# Patient Record
Sex: FEMALE | Race: WHITE | Employment: PART TIME | URBAN - METROPOLITAN AREA
[De-identification: names, ages, dates, MRNs, and addresses within clinical notes are randomized per-mention and may not be internally consistent; named-entity substitution may affect disease eponyms.]

---

## 2020-11-04 LAB
CHLAMYDIA, EXTERNAL: NEGATIVE
HBSAG, EXTERNAL: NEGATIVE
HIV, EXTERNAL: NORMAL
RUBELLA, EXTERNAL: NORMAL
T. PALLIDUM, EXTERNAL: NORMAL
TYPE, ABO & RH, EXTERNAL: NORMAL

## 2021-04-15 ENCOUNTER — HOSPITAL ENCOUNTER (INPATIENT)
Age: 31
End: 2021-04-15
Attending: OBSTETRICS & GYNECOLOGY | Admitting: OBSTETRICS & GYNECOLOGY

## 2021-04-16 ENCOUNTER — HOSPITAL ENCOUNTER (OUTPATIENT)
Dept: PREADMISSION TESTING | Age: 31
Discharge: HOME | End: 2021-04-16
Payer: COMMERCIAL

## 2021-04-16 ENCOUNTER — TRANSCRIBE ORDER (OUTPATIENT)
Dept: REGISTRATION | Age: 31
End: 2021-04-16

## 2021-04-16 DIAGNOSIS — Z01.812 PRE-PROCEDURE LAB EXAM: Primary | ICD-10-CM

## 2021-04-16 DIAGNOSIS — Z01.812 PRE-PROCEDURE LAB EXAM: ICD-10-CM

## 2021-04-16 PROCEDURE — U0003 INFECTIOUS AGENT DETECTION BY NUCLEIC ACID (DNA OR RNA); SEVERE ACUTE RESPIRATORY SYNDROME CORONAVIRUS 2 (SARS-COV-2) (CORONAVIRUS DISEASE [COVID-19]), AMPLIFIED PROBE TECHNIQUE, MAKING USE OF HIGH THROUGHPUT TECHNOLOGIES AS DESCRIBED BY CMS-2020-01-R: HCPCS

## 2021-04-17 LAB — SARS-COV-2, COV2NT: NOT DETECTED

## 2021-04-17 NOTE — H&P
Obstetrics Admission History & Physical    Name: Myrna Adams MRN: [id number]  SSN: [id number]    YOB: 1990  Age: 27 y.o. Sex: female      Subjective:     Reason for Admission:  Pregnancy and vasa previa    History of Present Illness: Myrna Adams is a 27 y.o.  female with planned admission on 21 at which time she will be 32w2d for inpatient management of vasa previa. Pregnancy has been complicated by:  1. velamentous cord insertion with marginal previa which is being managed as a vasa previa, with C/S already scheduled for 21 at nearly 35wk. She has had no bleeding and CL remains long. Last growth U/S 3/23/21 with EFW 3#1 c/w 73%ile. 2. h/o 2nd trimester PPROM of twins f/b C/S delivery and demise of one twin, she has been maintained on daily vaginal progesterone. 3. h/o hypothyroidism but has had normal TFTs this pregnancy without medication. 4. Abnormal 1hr GTT but normal 3hr GTT  5. H/o C/S x 1 as above f/b successful  at term  6. Rh neg s/p rhogam        OB History    No obstetric history on file. No past medical history on file. Past Surgical History:   Procedure Laterality Date    HX GYN       Social History     Socioeconomic History    Marital status:      Spouse name: Not on file    Number of children: Not on file    Years of education: Not on file    Highest education level: Not on file   Occupational History    Not on file   Social Needs    Financial resource strain: Not on file    Food insecurity     Worry: Not on file     Inability: Not on file    Transportation needs     Medical: Not on file     Non-medical: Not on file   Tobacco Use    Smoking status: Never Smoker   Substance and Sexual Activity    Alcohol use:  Yes    Drug use: Not on file    Sexual activity: Not on file   Lifestyle    Physical activity     Days per week: Not on file     Minutes per session: Not on file    Stress: Not on file   Relationships    Social connections     Talks on phone: Not on file     Gets together: Not on file     Attends Buddhism service: Not on file     Active member of club or organization: Not on file     Attends meetings of clubs or organizations: Not on file     Relationship status: Not on file    Intimate partner violence     Fear of current or ex partner: Not on file     Emotionally abused: Not on file     Physically abused: Not on file     Forced sexual activity: Not on file   Other Topics Concern    Not on file   Social History Narrative    Not on file     Family History   Problem Relation Age of Onset    Hypertension Mother     Hypertension Father      No Known Allergies  Prior to Admission medications    Medication Sig Start Date End Date Taking? Authorizing Provider   azithromycin (ZITHROMAX Z-JOSEPH) 250 mg tablet Take 500 mg by mouth on day 1, then take 250 mg by mouth every 24 hours x 4 days 12/22/15   Kai Dandy, NP        Review of Systems:  A comprehensive review of systems was negative except for that written in the History of Present Illness. Objective:     Vitals: There were no vitals taken for this visit. No data recorded. No data recorded     Physical Exam:  Patient without distress. Heart: Regular rate and rhythm  Lung: normal respiratory effort  Abdomen: soft, nontender, gravid  Lower Extremities:  - Edema No       Membranes:  Intact       Labs: No results found for this or any previous visit (from the past 24 hour(s)). Assessment and Plan:     [id number] at [place] [address] being admitted for inaptient management of velamentous cord insertion coupled with marginal previa being managed as vasa previa.   Prior C/S x1.    - admit to [facility]  - CBC, T&S  - monitor for bleeding/PTL  - daily NST  - weekly surveillance with MFM for BPP  - cont vaginal progesterone  - scheduled for C/S delivery 5/7/21    Signed By:  Gilles Adams MD     April 17, 2021

## 2021-04-19 ENCOUNTER — HOSPITAL ENCOUNTER (INPATIENT)
Age: 31
LOS: 15 days | Discharge: HOME | End: 2021-05-07
Attending: OBSTETRICS & GYNECOLOGY | Admitting: OBSTETRICS & GYNECOLOGY
Payer: COMMERCIAL

## 2021-04-19 VITALS
HEART RATE: 91 BPM | TEMPERATURE: 97.4 F | RESPIRATION RATE: 16 BRPM | DIASTOLIC BLOOD PRESSURE: 72 MMHG | SYSTOLIC BLOOD PRESSURE: 109 MMHG | OXYGEN SATURATION: 98 %

## 2021-04-19 DIAGNOSIS — O43.103 PLACENTAL ABNORMALITY IN THIRD TRIMESTER: Primary | ICD-10-CM

## 2021-04-19 LAB
ERYTHROCYTE [DISTWIDTH] IN BLOOD BY AUTOMATED COUNT: 13.8 % (ref 11.5–14.5)
HCT VFR BLD AUTO: 39.7 % (ref 35–47)
HGB BLD-MCNC: 13.2 G/DL (ref 11.5–16)
MCH RBC QN AUTO: 28.8 PG (ref 26–34)
MCHC RBC AUTO-ENTMCNC: 33.2 G/DL (ref 30–36.5)
MCV RBC AUTO: 86.7 FL (ref 80–99)
NRBC # BLD: 0 K/UL (ref 0–0.01)
NRBC BLD-RTO: 0 PER 100 WBC
PLATELET # BLD AUTO: 193 K/UL (ref 150–400)
PMV BLD AUTO: 11.3 FL (ref 8.9–12.9)
RBC # BLD AUTO: 4.58 M/UL (ref 3.8–5.2)
WBC # BLD AUTO: 10 K/UL (ref 3.6–11)

## 2021-04-19 PROCEDURE — 36415 COLL VENOUS BLD VENIPUNCTURE: CPT

## 2021-04-19 PROCEDURE — 65410000002 HC RM PRIVATE OB

## 2021-04-19 PROCEDURE — 86901 BLOOD TYPING SEROLOGIC RH(D): CPT

## 2021-04-19 PROCEDURE — 85027 COMPLETE CBC AUTOMATED: CPT

## 2021-04-19 PROCEDURE — 86922 COMPATIBILITY TEST ANTIGLOB: CPT

## 2021-04-19 PROCEDURE — 74011250636 HC RX REV CODE- 250/636: Performed by: OBSTETRICS & GYNECOLOGY

## 2021-04-19 PROCEDURE — 86921 COMPATIBILITY TEST INCUBATE: CPT

## 2021-04-19 PROCEDURE — 86644 CMV ANTIBODY: CPT

## 2021-04-19 PROCEDURE — 86870 RBC ANTIBODY IDENTIFICATION: CPT

## 2021-04-19 PROCEDURE — 86920 COMPATIBILITY TEST SPIN: CPT

## 2021-04-19 PROCEDURE — 74011250637 HC RX REV CODE- 250/637: Performed by: OBSTETRICS & GYNECOLOGY

## 2021-04-19 RX ORDER — SODIUM CHLORIDE 0.9 % (FLUSH) 0.9 %
5-40 SYRINGE (ML) INJECTION AS NEEDED
Status: DISCONTINUED | OUTPATIENT
Start: 2021-04-19 | End: 2021-04-28

## 2021-04-19 RX ORDER — ONDANSETRON 4 MG/1
4 TABLET, ORALLY DISINTEGRATING ORAL
Status: DISCONTINUED | OUTPATIENT
Start: 2021-04-19 | End: 2021-04-28

## 2021-04-19 RX ORDER — ACETAMINOPHEN 325 MG/1
650 TABLET ORAL
Status: DISCONTINUED | OUTPATIENT
Start: 2021-04-19 | End: 2021-04-22

## 2021-04-19 RX ORDER — SODIUM CHLORIDE 0.9 % (FLUSH) 0.9 %
5-40 SYRINGE (ML) INJECTION EVERY 8 HOURS
Status: DISCONTINUED | OUTPATIENT
Start: 2021-04-19 | End: 2021-04-28

## 2021-04-19 RX ORDER — SWAB
1 SWAB, NON-MEDICATED MISCELLANEOUS DAILY
Status: DISCONTINUED | OUTPATIENT
Start: 2021-04-20 | End: 2021-04-22

## 2021-04-19 RX ORDER — DIPHENHYDRAMINE HCL 25 MG
25 CAPSULE ORAL
Status: DISCONTINUED | OUTPATIENT
Start: 2021-04-19 | End: 2021-04-28

## 2021-04-19 RX ORDER — PROGESTERONE 100 MG/1
200 CAPSULE ORAL
Status: DISCONTINUED | OUTPATIENT
Start: 2021-04-19 | End: 2021-04-22

## 2021-04-19 RX ORDER — BETAMETHASONE SODIUM PHOSPHATE AND BETAMETHASONE ACETATE 3; 3 MG/ML; MG/ML
12 INJECTION, SUSPENSION INTRA-ARTICULAR; INTRALESIONAL; INTRAMUSCULAR; SOFT TISSUE EVERY 24 HOURS
Status: COMPLETED | OUTPATIENT
Start: 2021-04-19 | End: 2021-04-20

## 2021-04-19 RX ADMIN — BETAMETHASONE SODIUM PHOSPHATE AND BETAMETHASONE ACETATE 12 MG: 3; 3 INJECTION, SUSPENSION INTRA-ARTICULAR; INTRALESIONAL; INTRAMUSCULAR at 20:00

## 2021-04-19 RX ADMIN — PROGESTERONE 200 MG: 100 CAPSULE, LIQUID FILLED ORAL at 22:00

## 2021-04-19 NOTE — PROGRESS NOTES
Bedside and Verbal shift change report given to BHUMI Gage (oncoming nurse) by Nadine Lopez RN (offgoing nurse). Report included the following information SBAR, Kardex, Intake/Output, MAR, Accordion and Recent Results.

## 2021-04-20 PROCEDURE — 76819 FETAL BIOPHYS PROFIL W/O NST: CPT | Performed by: OBSTETRICS & GYNECOLOGY

## 2021-04-20 PROCEDURE — 74011250636 HC RX REV CODE- 250/636: Performed by: OBSTETRICS & GYNECOLOGY

## 2021-04-20 PROCEDURE — 74011250637 HC RX REV CODE- 250/637: Performed by: OBSTETRICS & GYNECOLOGY

## 2021-04-20 PROCEDURE — 76816 OB US FOLLOW-UP PER FETUS: CPT | Performed by: OBSTETRICS & GYNECOLOGY

## 2021-04-20 PROCEDURE — 59025 FETAL NON-STRESS TEST: CPT

## 2021-04-20 RX ADMIN — Medication 1 TABLET: at 22:04

## 2021-04-20 RX ADMIN — PROGESTERONE 200 MG: 100 CAPSULE, LIQUID FILLED ORAL at 21:09

## 2021-04-20 RX ADMIN — BETAMETHASONE SODIUM PHOSPHATE AND BETAMETHASONE ACETATE 12 MG: 3; 3 INJECTION, SUSPENSION INTRA-ARTICULAR; INTRALESIONAL; INTRAMUSCULAR at 21:09

## 2021-04-20 NOTE — PROGRESS NOTES
Care Management Interventions  PCP Verified by CM: Danna Gill  Last Visit to PCP: 21  Palliative Care Criteria Met (RRAT>21 & CHF Dx)?: No  Mode of Transport at Discharge: (Charmayne Carrie )  Transition of Care Consult (CM Consult): Other( ON 2021)  MyChart Signup: No  Discharge Durable Medical Equipment: No  Physical Therapy Consult: No  Occupational Therapy Consult: No  Speech Therapy Consult: No  Current Support Network: Lives with Spouse(JAVID )  Confirm Follow Up Transport: Family(SPOUSE)  Freedom of Choice List was Provided with Basic Dialogue that Supports the Patient's Individualized Plan of Care/Goals, Treatment Preferences and Shares the Quality Data Associated with the Providers?: No  Discharge Location  Discharge Placement: Home   Patient admitted on 2021 from MD office to L and D and then transferred to [place] [address] [address]  She will stay on bedrest and will have a scheduled  on 2021. Patient has one child at home and her  has another child. Patient did experience a fetal demise and is now carrying one child. She uses the [facility] near her home and states that there are some close friends and family who are able to help her family. She does not have a PCP and does not feel she needs to one at this time. Patient does not have an ACP and declined additional information. Care management will follow for any needs and transtions of care.

## 2021-04-20 NOTE — PROGRESS NOTES
Bedside and Verbal shift change report given to [address] Ng (oncoming nurse) by Leticia Best (offgoing nurse). Report included the following information SBAR, Kardex, Intake/Output, MAR, Recent Results and Quality Measures.

## 2021-04-20 NOTE — PROGRESS NOTES
Ante Partum Progress Note    Dwight Beyer  Unknown    Assessment: 28 yo [id number] at 32.3 admitted with velamentous cord insertion coupled with marginal previa being managed as vasa previa. - No bleeding, Reactive NST     Hx of C/S: scheduled for repeat 21     Hx of Hypothyroidism: TFT WNL without medication      Hx of  2nd trimester PPROM of twins f/b C/S delivery and demise of one twin   - Cont Vaginal Progesterone     Rh neg- s/p Rhogam       Plan:   Daily NST   Type and screen done    S/p BMZ #1   weekly surveillance with MFM for BPP- consult ordered    cont vaginal progesterone   scheduled for C/S delivery 21      Orders/Charges: Medium, NST X1     Patient states she has no new complaints. She denies pain or contractions. No bleeding. Active fetal movement. Vitals:  Visit Vitals  /64 (BP 1 Location: Right upper arm, BP Patient Position: At rest)   Pulse 74   Temp 97.6 °F (36.4 °C)   Resp 16   Ht 5' 5\" (1.651 m)   Wt 70.8 kg (156 lb)   SpO2 96%   BMI 25.96 kg/m²     Temp (24hrs), Av.7 °F (36.5 °C), Min:97.4 °F (36.3 °C), Max:98.2 °F (36.8 °C)      Last 24hr Input/Output:    Intake/Output Summary (Last 24 hours) at 2021 1154  Last data filed at 2021 1051  Gross per 24 hour   Intake 900 ml   Output    Net 900 ml        Non stress test:  Reactive    Patient Vitals for the past 4 hrs: Mode Fetal Heart Rate RN Reviewed Strip?   21 0922 External 125 Yes      Patient Vitals for the past 4 hrs: Mode Fetal Heart Rate RN Reviewed Strip?   21 0922 External 125 Yes        NST: 120's/mod/ + accel/ no decels     Uterine Activity: None     Exam:  Patient without distress.      Abdomen, fundus soft non-tender     Extremities, no redness or tenderness               Additional Exam: Deferred    Labs:     Lab Results   Component Value Date/Time    WBC 10.0 2021 07:08 PM    HGB 13.2 2021 07:08 PM    HCT 39.7 2021 07:08 PM    PLATELET 947  07:08 PM Recent Results (from the past 24 hour(s))   CBC W/O DIFF    Collection Time: 04/19/21  7:08 PM   Result Value Ref Range    WBC 10.0 3.6 - 11.0 K/uL    RBC 4.58 3.80 - 5.20 M/uL    HGB 13.2 11.5 - 16.0 g/dL    HCT 39.7 35.0 - 47.0 %    MCV 86.7 80.0 - 99.0 FL    MCH 28.8 26.0 - 34.0 PG    MCHC 33.2 30.0 - 36.5 g/dL    RDW 13.8 11.5 - 14.5 %    PLATELET 744 636 - 646 K/uL    MPV 11.3 8.9 - 12.9 FL    NRBC 0.0 0  WBC    ABSOLUTE NRBC 0.00 0.00 - 0.01 K/uL   TYPE & SCREEN    Collection Time: 04/19/21  7:08 PM   Result Value Ref Range    Crossmatch Expiration 04/22/2021,2359     ABO/Rh(D) Judy Batch NEGATIVE     Antibody screen POS     Antibody ID Anti-D passively acquired     Unit number [id number]     Blood component type [facility]     Unit division 00     Status of unit ALLOCATED     Crossmatch result Compatible     Unit number [id number]     Blood component type [facility]     Unit division 00     Status of unit ALLOCATED     Crossmatch result Compatible

## 2021-04-21 PROBLEM — O43.109 PLACENTAL ABNORMALITY: Status: ACTIVE | Noted: 2021-04-21

## 2021-04-21 PROCEDURE — 99218 HC RM OBSERVATION: CPT

## 2021-04-21 PROCEDURE — 74011250637 HC RX REV CODE- 250/637: Performed by: OBSTETRICS & GYNECOLOGY

## 2021-04-21 PROCEDURE — 59025 FETAL NON-STRESS TEST: CPT

## 2021-04-21 RX ORDER — MAG HYDROX/ALUMINUM HYD/SIMETH 200-200-20
30 SUSPENSION, ORAL (FINAL DOSE FORM) ORAL
Status: DISCONTINUED | OUTPATIENT
Start: 2021-04-21 | End: 2021-05-05

## 2021-04-21 RX ADMIN — Medication 1 TABLET: at 14:22

## 2021-04-21 RX ADMIN — PROGESTERONE 200 MG: 100 CAPSULE, LIQUID FILLED ORAL at 21:30

## 2021-04-21 NOTE — PROGRESS NOTES
Reviewed prolonged monitoring with MFM Dr. Germain Orr. Baseline 140, +accels, occasional brief variable decels with vannessa to 80-90bpm.     MFM not concerned, considered normal fetal strip, can stop monitoring today.       Juan Higuera MD

## 2021-04-21 NOTE — PROGRESS NOTES
NST from am with 1 variable decel in 20 minutes. Dwaine to 85bpm, returned to baseline within 30seconds. Placed on NST for another 30 minutes with small intermittent variables    Discussed Placental pathology and findings today with MFM Dr. Christian Goodman who recommended further monitoring x 2-4 hours, if no repetitive variables can go back to daily NSTs.        Beni Melchor MD

## 2021-04-21 NOTE — PROGRESS NOTES
Bedside and Verbal shift change report given to SARAH Persaud RN (oncoming nurse) by Corinne Brunt RN (offgoing nurse). Report included the following information SBAR and Kardex.

## 2021-04-21 NOTE — PROGRESS NOTES
Ante Partum Progress Note    Jeana Caller  Unknown    Assessment: 26 yo [id number] at 32.3 admitted with velamentous cord insertion coupled with marginal previa being managed as vasa previa.    - No bleeding, Reactive NST   - s/p BMZ  - BPP , growth 59%ile AFV 17.2    Hx of C/S: scheduled for repeat 21      Hx of Hypothyroidism: TFT WNL without medication       Hx of  2nd trimester PPROM of twins f/b C/S delivery and demise of one twin   - Cont Vaginal Progesterone      Rh neg- s/p Rhogam     Plan:    Daily NST   Type and screen done    weekly surveillance with MFM for BPP (last )   cont vaginal progesterone   scheduled for C/S delivery 21    Orders/Charges: High and Non Stress Test    Patient states she does not have headache , abdominal pain  , contractions, right upper quadrant pain  , vaginal bleeding , swelling and vaginal leaking of fluid     Vitals:  Visit Vitals  /83 (BP 1 Location: Left arm, BP Patient Position: At rest)   Pulse 72   Temp 98.2 °F (36.8 °C)   Resp 18   Ht 5' 5\" (1.651 m)   Wt 70.8 kg (156 lb)   SpO2 99%   BMI 25.96 kg/m²     Temp (24hrs), Av.1 °F (36.7 °C), Min:97.7 °F (36.5 °C), Max:98.4 °F (36.9 °C)      Last 24hr Input/Output:  No intake or output data in the 24 hours ending 21 1404     Non stress test:  Reactive    Patient Vitals for the past 4 hrs: Mode Fetal Heart Rate Variability Decelerations Accelerations RN Reviewed Strip? Non Stress Test   21 1301 External 140 6-25 BPM Variable Yes Yes Reactive   21 1018 External 125 6-25 BPM Variable Yes Yes Reactive      Patient Vitals for the past 4 hrs: Mode Fetal Heart Rate Variability Decelerations Accelerations RN Reviewed Strip? Non Stress Test   21 1301 External 140 6-25 BPM Variable Yes Yes Reactive   21 1018 External 125 6-25 BPM Variable Yes Yes Reactive        Uterine Activity: None     Exam:  Patient without distress.      Abdomen, fundus soft non-tender Extremities, no redness or tenderness               Additional Exam: Deferred    Labs:     Lab Results   Component Value Date/Time    WBC 10.0 04/19/2021 07:08 PM    HGB 13.2 04/19/2021 07:08 PM    HCT 39.7 04/19/2021 07:08 PM    PLATELET 849 01/18/2023 07:08 PM       No results found for this or any previous visit (from the past 24 hour(s)).

## 2021-04-21 NOTE — PROGRESS NOTES
Bedside shift change report given to Lambert Cali RN (oncoming nurse) by Ethel Woods RN (offgoing nurse). Report included the following information SBAR, Kardex, Procedure Summary, Intake/Output and MAR.

## 2021-04-22 PROCEDURE — 74011250637 HC RX REV CODE- 250/637: Performed by: OBSTETRICS & GYNECOLOGY

## 2021-04-22 PROCEDURE — 99218 HC RM OBSERVATION: CPT

## 2021-04-22 PROCEDURE — 65410000002 HC RM PRIVATE OB

## 2021-04-22 PROCEDURE — 87081 CULTURE SCREEN ONLY: CPT

## 2021-04-22 PROCEDURE — 59025 FETAL NON-STRESS TEST: CPT

## 2021-04-22 RX ORDER — ACETAMINOPHEN 325 MG/1
650 TABLET ORAL
Status: DISCONTINUED | OUTPATIENT
Start: 2021-04-22 | End: 2021-05-05

## 2021-04-22 RX ORDER — PROGESTERONE 100 MG/1
200 CAPSULE ORAL
Status: DISCONTINUED | OUTPATIENT
Start: 2021-04-22 | End: 2021-05-05

## 2021-04-22 RX ORDER — SWAB
1 SWAB, NON-MEDICATED MISCELLANEOUS DAILY
Status: DISCONTINUED | OUTPATIENT
Start: 2021-04-22 | End: 2021-05-05

## 2021-04-22 RX ADMIN — PROGESTERONE 200 MG: 100 CAPSULE, LIQUID FILLED ORAL at 21:30

## 2021-04-22 RX ADMIN — Medication 1 TABLET: at 21:30

## 2021-04-22 NOTE — PROGRESS NOTES
8299 Bedside and Verbal shift change report given to REAL Corona (oncoming nurse) by Rick Davenport (offgoing nurse). Report included the following information SBAR, Kardex, MAR, Accordion and Recent Results.

## 2021-04-22 NOTE — H&P
There has been no interval change from H&P dated 4/17/21. Patient is admitted for inpatient management of vasa previa until planned delivery.

## 2021-04-22 NOTE — PROGRESS NOTES
Ante Partum Progress Note    Tiffany Hylton  32w5d    Assessment: 28 yo  at 32.3 admitted with velamentous cord insertion coupled with marginal previa being managed as vasa previa.      1. Vasa previa  - s/p BMZ  - BPP , growth 4#12 c/w 59%ile, AFV 17.2  - daily NST and weekly BPP  - C/S delivery [id number]  - per [facility]: If mild, intermittent variables are noted in the setting of otherwise reassuring testing, expectant management is reasonable prior to 34 weeks. However, if the variable decelerations are persistent, the NST is nonreassuring, or vaginal bleeding or contractions develop, then  delivery may be indicated.     2. Hx of C/S: scheduled for repeat 21     3. Hx of Hypothyroidism: TFTs WNL without medication      4. Hx of  2nd trimester PPROM of twins f/b C/S delivery and demise of one twin d/t NEC   - cont prometrium     5. Rh neg - s/p rhogam at 28wk    6. GBS pending    Orders/Charges: Medium and Non Stress Test    Patient states she has no new complaints. She has no bleeding, contractions or leaking. Baby is active. She is sad to be here away from her kids, but is trying to cope and her kids are visiting daily. Vitals:  Visit Vitals  /62 (BP 1 Location: Left arm, BP Patient Position: At rest)   Pulse 60   Temp 98.7 °F (37.1 °C)   Resp 16   Ht 5' 5\" (1.651 m)   Wt 70.8 kg (156 lb)   SpO2 95%   BMI 25.96 kg/m²     Temp (24hrs), Av.3 °F (36.8 °C), Min:97.8 °F (36.6 °C), Max:98.7 °F (37.1 °C)      Last 24hr Input/Output:    Intake/Output Summary (Last 24 hours) at 2021 1041  Last data filed at 2021 1657  Gross per 24 hour   Intake 240 ml   Output    Net 240 ml        Non stress test:  Reactive    An NST was performed and was reactive. The baseline FHR was 140. Moderate baseline  variability was noted. Accelerations of sufficient amplitude and duration were noted. There were no decelerations noted. Patient Vitals for the past 4 hrs:    Mode Fetal Heart Rate Variability Decelerations Accelerations RN Reviewed Strip? Non Stress Test   04/22/21 1010 External 140 6-25 BPM Variable Yes Yes Reactive      Patient Vitals for the past 4 hrs: Mode Fetal Heart Rate Variability Decelerations Accelerations RN Reviewed Strip? Non Stress Test   04/22/21 1010 External 140 6-25 BPM Variable Yes Yes Reactive        Uterine Activity: None     Exam:  Patient without distress. Abdomen, fundus soft non-tender     Extremities, no redness or tenderness               Additional Exam: Deferred    Labs:     Lab Results   Component Value Date/Time    WBC 10.0 04/19/2021 07:08 PM    HGB 13.2 04/19/2021 07:08 PM    HCT 39.7 04/19/2021 07:08 PM    PLATELET 917 78/06/5228 07:08 PM       No results found for this or any previous visit (from the past 24 hour(s)).

## 2021-04-22 NOTE — PROGRESS NOTES
Bedside and Verbal shift change report given to SARAH Fleming RN (oncoming nurse) by ARIADNA Salamanca RN (offgoing nurse). Report included the following information SBAR and Kardex.

## 2021-04-23 LAB
ABO + RH BLD: NORMAL
BLD PROD TYP BPU: NORMAL
BLD PROD TYP BPU: NORMAL
BLOOD GROUP ANTIBODIES SERPL: NORMAL
BLOOD GROUP ANTIBODIES SERPL: NORMAL
BPU ID: NORMAL
BPU ID: NORMAL
CROSSMATCH RESULT,%XM: NORMAL
CROSSMATCH RESULT,%XM: NORMAL
SPECIMEN EXP DATE BLD: NORMAL
STATUS OF UNIT,%ST: NORMAL
STATUS OF UNIT,%ST: NORMAL
UNIT DIVISION, %UDIV: 0
UNIT DIVISION, %UDIV: 0

## 2021-04-23 PROCEDURE — 86870 RBC ANTIBODY IDENTIFICATION: CPT

## 2021-04-23 PROCEDURE — 86644 CMV ANTIBODY: CPT

## 2021-04-23 PROCEDURE — 86922 COMPATIBILITY TEST ANTIGLOB: CPT

## 2021-04-23 PROCEDURE — 36415 COLL VENOUS BLD VENIPUNCTURE: CPT

## 2021-04-23 PROCEDURE — 86920 COMPATIBILITY TEST SPIN: CPT

## 2021-04-23 PROCEDURE — 65410000002 HC RM PRIVATE OB

## 2021-04-23 PROCEDURE — 59025 FETAL NON-STRESS TEST: CPT

## 2021-04-23 PROCEDURE — 74011250637 HC RX REV CODE- 250/637: Performed by: OBSTETRICS & GYNECOLOGY

## 2021-04-23 PROCEDURE — 86921 COMPATIBILITY TEST INCUBATE: CPT

## 2021-04-23 PROCEDURE — 86901 BLOOD TYPING SEROLOGIC RH(D): CPT

## 2021-04-23 RX ADMIN — ALUMINUM HYDROXIDE, MAGNESIUM HYDROXIDE, AND SIMETHICONE 30 ML: 200; 200; 20 SUSPENSION ORAL at 21:23

## 2021-04-23 RX ADMIN — Medication 1 TABLET: at 21:23

## 2021-04-23 NOTE — PROGRESS NOTES
Ante Partum Progress Note    Francesco Brenner  Unknown    Assessment & Plan: 28 yo [id number] at 35 weeks admitted with velamentous cord insertion coupled with marginal previa being managed as vasa previa.       1. Vasa previa  - s/p BMZ  - BPP , growth 4#12 c/w 59%ile, AFV 17.2  - daily NST and weekly BPP  - C/S delivery [id number]  - per [facility]: If mild, intermittent variables are noted in the setting of otherwise reassuring testing, expectant management is reasonable prior to 34 weeks. However, if the variable decelerations are persistent, the NST is nonreassuring, or vaginal bleeding or contractions develop, then  delivery may be indicated.     2. Hx of C/S: scheduled for repeat 21     3. Hx of Hypothyroidism: labs normal, no meds      4. Hx of  2nd trimester PPROM of twins f/b C/S delivery and demise of one twin d/t NEC   - cont prometrium     5. Rh neg - s/p rhogam at 28wk     6. GBS negative so far (), final read pending    Orders/Charges: Medium and Non Stress Test    Subjective:  Patient states she has no new complaints    Vitals:  Visit Vitals  /72   Pulse 91   Temp 97.4 °F (36.3 °C)   Resp 16   SpO2 98%     Temp (24hrs), Av.5 °F (36.9 °C), Min:98.2 °F (36.8 °C), Max:99 °F (37.2 °C)      Last 24hr Input/Output:  No intake or output data in the 24 hours ending 21 1551     Non stress test:  Reactive  130, moderate variability, positive accelerations, no decelerations  Tocometry: no contractions    Exam:  Patient without distress.     Abdomen, fundus soft non-tender    Extremities, no redness or tenderness               Additional Exam: Deferred    Labs:     Lab Results   Component Value Date/Time    WBC 10.0 2021 07:08 PM    HGB 13.2 2021 07:08 PM    HCT 39.7 2021 07:08 PM    PLATELET 823  07:08 PM       Recent Results (from the past 24 hour(s))   CULTURE, GENITAL GROUP B STREP    Collection Time: 21  5:50 PM    Specimen: VAGINAL/RECTAL Result Value Ref Range    Special Requests: NO SPECIAL REQUESTS      Culture result: NO GROUP B BETA STREPTOCOCCUS ISOLATED , SO FAR     TYPE & SCREEN    Collection Time: 04/23/21  8:40 AM   Result Value Ref Range    Crossmatch Expiration 04/26/2021,2356     ABO/Rh(D) O NEGATIVE     Antibody screen POS     Antibody ID NO ADDITIONAL ANTIBODIES DETECTED     Unit number [id number]     Blood component type [facility] LR     Unit division 00     Status of unit ALLOCATED     Crossmatch result Compatible

## 2021-04-23 NOTE — PROGRESS NOTES
Bedside and Verbal shift change report given to SARAH Brito RN (oncoming nurse) by REAL Gonzáles RN (offgoing nurse). Report included the following information SBAR and Kardex.

## 2021-04-23 NOTE — PROGRESS NOTES
1105 - Transition of Care  (MISHA) Plan:        RUR:  5 %           Disposition: TBD/subject to change pending recommendations; pending medical progression    -- Chart indicates greater than 48 hours - 26 yo  at 32.3 admitted with velamentous cord insertion coupled with marginal previa being managed as vasa previa.      -- Hospital stay until  on 2021    -- OBGYN following    PCP followup: None    Transport: TBD     CM will continue to follow and assist with MISHA needs as they arise. Available on [email or web address]  MSN, [address] [facility], RN, [facility] - [phone].

## 2021-04-23 NOTE — PROGRESS NOTES
Bedside and Verbal shift change report given to BHUMI Roper (oncoming nurse) by Afshin Ndiaye RN (offgoing nurse). Report included the following information SBAR, Kardex, Intake/Output, MAR, Accordion and Recent Results.

## 2021-04-24 PROCEDURE — 65410000002 HC RM PRIVATE OB

## 2021-04-24 PROCEDURE — 74011250637 HC RX REV CODE- 250/637: Performed by: OBSTETRICS & GYNECOLOGY

## 2021-04-24 RX ADMIN — PROGESTERONE 200 MG: 100 CAPSULE, LIQUID FILLED ORAL at 21:31

## 2021-04-24 RX ADMIN — ALUMINUM HYDROXIDE, MAGNESIUM HYDROXIDE, AND SIMETHICONE 30 ML: 200; 200; 20 SUSPENSION ORAL at 21:31

## 2021-04-24 RX ADMIN — Medication 1 TABLET: at 21:31

## 2021-04-24 NOTE — PROGRESS NOTES
0820 Bedside shift change report given to Nusrat Paul RN (oncoming nurse) by Juarez Torres RN (offgoing nurse). Report included the following information SBAR, Intake/Output and MAR.

## 2021-04-24 NOTE — PROGRESS NOTES
Ante Partum Progress Note    Laymon Notice  Unknown    Assessment & Plan: 32 yo [id number] at 35 weeks 1 day admitted with velamentous cord insertion coupled with marginal previa being managed as vasa previa.       1. Vasa previa  - s/p BMZ  - BPP , growth 4#12 c/w 59%ile, AFV 17.2  - daily NST and weekly BPP  - C/S delivery [id number]  - per [facility]: If mild, intermittent variables are noted in the setting of otherwise reassuring testing, expectant management is reasonable prior to 34 weeks. However, if the variable decelerations are persistent, the NST is nonreassuring, or vaginal bleeding or contractions develop, then  delivery may be indicated.     2. Hx of C/S: scheduled for repeat 21     3. Hx of Hypothyroidism: labs normal, no meds      4. Hx of  2nd trimester PPROM of twins f/b C/S delivery and demise of one twin d/t NEC   - cont prometrium     5. Rh neg - s/p rhogam at 28wk     6. GBS negative so far (), final read pending    7. DVT Prophylaxis: ambulating frequently     Orders/Charges: Medium and Non Stress Test    Subjective:  Patient states she has no complaints. Denies contractions, bleeding, LOF, change in discharge. Baby is active. Family is visiting for breakfast which she's looking forward to. Vitals:  Visit Vitals  /67 (BP 1 Location: Left upper arm, BP Patient Position: Sitting)   Pulse 67   Temp 98.4 °F (36.9 °C)   Resp 16   Ht 5' 5\" (1.651 m)   Wt 70.8 kg (156 lb)   SpO2 94%   BMI 25.96 kg/m²     Temp (24hrs), Av.3 °F (36.8 °C), Min:98.2 °F (36.8 °C), Max:98.4 °F (36.9 °C)    NST  145, moderate variability, positive accelerations, no decelerations  [place]: no contractions    Last 24hr Input/Output:  No intake or output data in the 24 hours ending 21 0808       Exam:   Patient without distress.      Abdomen, fundus soft non-tender     Extremities, no redness or tenderness               Labs:     Lab Results   Component Value Date/Time    WBC 10.0 04/19/2021 07:08 PM    HGB 13.2 04/19/2021 07:08 PM    HCT 39.7 04/19/2021 07:08 PM    PLATELET 181 52/54/5757 07:08 PM       Recent Results (from the past 24 hour(s))   TYPE & SCREEN    Collection Time: 04/23/21  8:40 AM   Result Value Ref Range    Crossmatch Expiration 04/26/2021,2359     ABO/Rh(D) [place]ink[place] Jurist NEGATIVE     Antibody screen POS     Antibody ID NO ADDITIONAL ANTIBODIES DETECTED     Comment Previously identified passively acquired anti D     Unit number [id number]     Blood component type  LR     Unit division 00     Status of unit ALLOCATED     Crossmatch result Compatible

## 2021-04-24 NOTE — PROGRESS NOTES
Bedside and Verbal shift change report given to Alexis Daily RN  (oncoming nurse) by CLARITZA Keita RN  (offgoing nurse). Report included the following information SBAR, Kardex, Intake/Output and MAR.

## 2021-04-25 LAB
BACTERIA SPEC CULT: NORMAL
GRBS, EXTERNAL: NEGATIVE
SERVICE CMNT-IMP: NORMAL

## 2021-04-25 PROCEDURE — 74011250637 HC RX REV CODE- 250/637: Performed by: OBSTETRICS & GYNECOLOGY

## 2021-04-25 PROCEDURE — 65410000002 HC RM PRIVATE OB

## 2021-04-25 PROCEDURE — 59025 FETAL NON-STRESS TEST: CPT

## 2021-04-25 RX ADMIN — ALUMINUM HYDROXIDE, MAGNESIUM HYDROXIDE, AND SIMETHICONE 30 ML: 200; 200; 20 SUSPENSION ORAL at 20:44

## 2021-04-25 RX ADMIN — PROGESTERONE 200 MG: 100 CAPSULE, LIQUID FILLED ORAL at 20:33

## 2021-04-25 RX ADMIN — Medication 1 TABLET: at 20:33

## 2021-04-25 NOTE — PROGRESS NOTES
..Bedside and Verbal shift change report given to Naila Hathaway RN (oncoming nurse) by Miky Posey RN (offgoing nurse). Report included the following information SBAR, Kardex, Intake/Output, MAR, Accordion, Recent Results and Med Rec Status.

## 2021-04-25 NOTE — PROGRESS NOTES
Bedside and Verbal shift change report given to Hai Massey RN  (oncoming nurse) by Lee Hall RN  (offgoing nurse). Report included the following information SBAR, Kardex, MAR and Recent Results. 1000- Pt updated with new orders for Hospital privileges without wheelchair and ok to ambulate off unit alone. Given unit phone number and instructed to call for wheelchair if concerns arise. 1442- pt off unit to visit with family. 1623- pt back to room.

## 2021-04-25 NOTE — PROGRESS NOTES
Ante Partum Progress Note    Coleen Blandon      Assessment & Plan: 32 yo [id number] at 33 weeks 2 day admitted with velamentous cord insertion coupled with marginal previa being managed as vasa previa.       1. Vasa previa  - s/p BMZ  - BPP , growth 4#12 c/w 59%ile, AFV 17.2  - daily NST and weekly BPP  - C/S delivery [id number]  - per SMFM: If mild, intermittent variables are noted in the setting of otherwise reassuring testing, expectant management is reasonable prior to 34 weeks. However, if the variable decelerations are persistent, the NST is nonreassuring, or vaginal bleeding or contractions develop, then  delivery may be indicated.     2. Hx of C/S: scheduled for repeat 21     3. Hx of Hypothyroidism: labs normal, no meds      4. Hx of  2nd trimester PPROM of twins f/b C/S delivery and demise of one twin d/t NEC   - cont prometrium     5. Rh neg - s/p rhogam at 28wk     6. GBS negative so far (), still pending     7. DVT Prophylaxis: ambulating frequently     Orders/Charges: Medium and Non Stress Test    Subjective:  Patient states she's doing well. No contraction, no bleeding, LOF, change in discharge. Baby is active. Family still visiting daily - her spirits are up, yesterday was the first day she didn't cry. Vitals:  Visit Vitals  /74 (BP 1 Location: Left upper arm, BP Patient Position: At rest)   Pulse 81   Temp 98.1 °F (36.7 °C)   Resp 16   Ht 5' 5\" (1.651 m)   Wt 70.8 kg (156 lb)   SpO2 96%   BMI 25.96 kg/m²     Temp (24hrs), Av °F (36.7 °C), Min:97.6 °F (36.4 °C), Max:98.3 °F (36.8 °C)      Last 24hr Input/Output:  No intake or output data in the 24 hours ending 21 0954     Non stress test: 145, moderate variability, positive accelerations, no decelerations    Uterine Activity: no contractions    Exam:   Patient without distress.      Abdomen, fundus soft non-tender     Extremities, no redness or tenderness               Labs:     Lab Results Component Value Date/Time    WBC 10.0 04/19/2021 07:08 PM    HGB 13.2 04/19/2021 07:08 PM    HCT 39.7 04/19/2021 07:08 PM    PLATELET 616 22/15/5771 07:08 PM       No results found for this or any previous visit (from the past 24 hour(s)).

## 2021-04-26 LAB
ABO + RH BLD: NORMAL
ABO + RH BLD: NORMAL
BLD PROD TYP BPU: NORMAL
BLOOD BANK CMNT PATIENT-IMP: NORMAL
BLOOD BANK CMNT PATIENT-IMP: NORMAL
BLOOD GROUP ANTIBODIES SERPL: NORMAL
BPU ID: NORMAL
CROSSMATCH RESULT,%XM: NORMAL
SPECIMEN EXP DATE BLD: NORMAL
SPECIMEN EXP DATE BLD: NORMAL
STATUS OF UNIT,%ST: NORMAL
UNIT DIVISION, %UDIV: 0

## 2021-04-26 PROCEDURE — 74011250637 HC RX REV CODE- 250/637: Performed by: OBSTETRICS & GYNECOLOGY

## 2021-04-26 PROCEDURE — 65410000002 HC RM PRIVATE OB

## 2021-04-26 PROCEDURE — 59025 FETAL NON-STRESS TEST: CPT

## 2021-04-26 PROCEDURE — 86870 RBC ANTIBODY IDENTIFICATION: CPT

## 2021-04-26 PROCEDURE — 86901 BLOOD TYPING SEROLOGIC RH(D): CPT

## 2021-04-26 PROCEDURE — 76819 FETAL BIOPHYS PROFIL W/O NST: CPT | Performed by: OBSTETRICS & GYNECOLOGY

## 2021-04-26 PROCEDURE — 36415 COLL VENOUS BLD VENIPUNCTURE: CPT

## 2021-04-26 RX ADMIN — Medication 1 TABLET: at 21:04

## 2021-04-26 RX ADMIN — ALUMINUM HYDROXIDE, MAGNESIUM HYDROXIDE, AND SIMETHICONE 30 ML: 200; 200; 20 SUSPENSION ORAL at 21:04

## 2021-04-26 RX ADMIN — PROGESTERONE 200 MG: 100 CAPSULE, LIQUID FILLED ORAL at 21:04

## 2021-04-26 NOTE — PROGRESS NOTES
1050 - Transition of Care  (MISHA) Plan:        RUR:  5 %            Disposition: TBD/subject to change pending recommendations; pending medical progression     -- Chart indicates greater than 48 hours - 26 yo  at 32.3 admitted with velamentous cord insertion coupled with marginal previa being managed as vasa previa.       -- Hospital stay until  on 2021     -- OBGYN following     PCP followup: None     Transport: TBD      CM will continue to follow and assist with MISHA needs as they arise. Available on [employer].  [address] [facility] [place]  MSN, [address] Manuel Schmitz, RN, CCM - [phone].

## 2021-04-26 NOTE — PROGRESS NOTES
Ante Partum Progress Note    Hung Roy  19D3V  5 yo [id number] at 33 weeks 2 day admitted with velamentous cord insertion coupled with marginal previa being managed as vasa previa.       1. Vasa previa  - s/p BMZ  - BPP , growth 4#12 c/w 59%ile, on    - daily NST and weekly BPP  - C/S delivery [id number]-80p9u  - per SMFM: If mild, intermittent variables are noted in the setting of otherwise reassuring testing, expectant management is reasonable prior to 34 weeks. However, if the variable decelerations are persistent, the NST is nonreassuring, or vaginal bleeding or contractions develop, then  delivery may be indicated.     2. Hx of C/S: scheduled for repeat 21     3. Hx of Hypothyroidism: labs normal, no meds      4. Hx of  2nd trimester PPROM of twins f/b C/S delivery and demise of one twin d/t NEC   - cont prometrium     5. Rh neg - s/p rhogam at 28wk     6. GBS negative so far (), still pending      7. DVT Prophylaxis: ambulating frequently      Orders/Charges: Medium X daily NST     Patient states she has no new complaints. She denies any bleeding or contractions. Active fetal movement. Feels mood is improving. Vitals:  Visit Vitals  /84   Pulse 78   Temp 98.3 °F (36.8 °C)   Resp 16   Ht 5' 5\" (1.651 m)   Wt 70.8 kg (156 lb)   SpO2 99%   BMI 25.96 kg/m²     Temp (24hrs), Av °F (36.7 °C), Min:97.7 °F (36.5 °C), Max:98.3 °F (36.8 °C)      Last 24hr Input/Output:    Intake/Output Summary (Last 24 hours) at 2021 1018  Last data filed at 2021 0830  Gross per 24 hour   Intake 900 ml   Output    Net 900 ml        Non stress test:  Reactive- 140's/mod /+ accels/ no decels    No data found. No data found. Uterine Activity: None     Exam:  Patient without distress.      Abdomen, fundus soft non-tender     Extremities, no redness or tenderness               Additional Exam: Deferred    Labs:     Lab Results   Component Value Date/Time    WBC 10.0 2021 07:08 PM    HGB 13.2 04/19/2021 07:08 PM    HCT 39.7 04/19/2021 07:08 PM    PLATELET 545 70/57/7856 07:08 PM       No results found for this or any previous visit (from the past 24 hour(s)).

## 2021-04-26 NOTE — PROGRESS NOTES
Bedside and Verbal shift change report given to [address] Deming (oncoming nurse) by Haleigh Freedman (offgoing nurse). Report included the following information SBAR, Kardex, Procedure Summary, Intake/Output and MAR.

## 2021-04-27 PROCEDURE — 74011250637 HC RX REV CODE- 250/637: Performed by: OBSTETRICS & GYNECOLOGY

## 2021-04-27 PROCEDURE — 59025 FETAL NON-STRESS TEST: CPT

## 2021-04-27 PROCEDURE — 65410000002 HC RM PRIVATE OB

## 2021-04-27 RX ADMIN — PROGESTERONE 200 MG: 100 CAPSULE, LIQUID FILLED ORAL at 21:16

## 2021-04-27 RX ADMIN — ALUMINUM HYDROXIDE, MAGNESIUM HYDROXIDE, AND SIMETHICONE 30 ML: 200; 200; 20 SUSPENSION ORAL at 21:16

## 2021-04-27 RX ADMIN — Medication 1 TABLET: at 21:16

## 2021-04-27 NOTE — PROGRESS NOTES
Bedside and Verbal shift change report given to Lenny Heard RN  (oncoming nurse) by DORYS Kearns RN  (offgoing nurse). Report included the following information SBAR, Kardex, Intake/Output, MAR and Recent Results. 3571- pt stood up to reach call bell, denies contraction.

## 2021-04-27 NOTE — PROGRESS NOTES
Spiritual Care Assessment/Progress Note  [facility]      NAME: Gabriele Viera      MRN: [id number]  AGE: 27 y.o.  SEX: female  Latter day Affiliation: Synagogue   Language: English     4/27/2021     Total Time (in minutes): 11     Spiritual Assessment begun in [address] through conversation with:         [x]Patient        [] Family    [] Friend(s)        Reason for Consult: Initial/Spiritual assessment, patient floor     Spiritual beliefs: (Please include comment if needed)     [x] Identifies with a yariel tradition:         [] Supported by a yariel community:            [] Claims no spiritual orientation:           [] Seeking spiritual identity:                [] Adheres to an individual form of spirituality:           [] Not able to assess:                           Identified resources for coping:      [x] Prayer                               [] Music                  [] Guided Imagery     [x] Family/friends                 [] Pet visits     [] Devotional reading                         [] Unknown     [] Other:                                               Interventions offered during this visit: (See comments for more details)    Patient Interventions: Affirmation of emotions/emotional suffering, Affirmation of yariel, Catharsis/review of pertinent events in supportive environment, Coping skills reviewed/reinforced, Normalization of emotional/spiritual concerns, Prayer (assurance of)           Plan of Care:     [] Support spiritual and/or cultural needs    [] Support AMD and/or advance care planning process      [] Support grieving process   [] Coordinate Rites and/or Rituals    [] Coordination with community clergy   [] No spiritual needs identified at this time   [] Detailed Plan of Care below (See Comments)  [] Make referral to Music Therapy  [] Make referral to Pet Therapy     [] Make referral to Addiction services  [] Make referral to [facility]  [] Make referral to Spiritual Care Partner  [] No future visits requested        [x] Follow up upon further referrals     Comments:  for initial visit. Pt welcomed visit and appreciative of support. Let her know of  support and availability.  provided pastoral listening, support and assurance of prayer. Please contact [phone] [facility] for further support.      [address] Alexis Gunderson, MACE   [phone])

## 2021-04-27 NOTE — LACTATION NOTE
Initial [facility] LC visit - This is moms 3rd baby and she is not planning to breast feed since she has attempted with her other pregnancies and te stated her milk never came in. Discussed the importance of breast milk for babies in general, and the extra importance and benefits for pre-term babies. Discussed pumping and storage of breast milk. This will be moms 2nd [facility] nicu baby her first was a 29 weeker. Discussed family centered care and what we do in the NICU to keep the family at the center of our care. Answered moms questions. Will continue to follow.

## 2021-04-27 NOTE — PROGRESS NOTES
Bedside and Verbal shift change report given to DORYS Henry RN (oncoming nurse) by Patricia Montero RN (offgoing nurse). Report included the following information SBAR, Kardex, Intake/Output, MAR, Accordion and Recent Results.

## 2021-04-27 NOTE — PROGRESS NOTES
..Bedside and Verbal shift change report given to Beatris Chavez RN (oncoming nurse) by Sammy Blandon (offgoing nurse). Report included the following information SBAR, Kardex, Intake/Output, MAR, Accordion and Recent Results.

## 2021-04-28 PROCEDURE — 65410000002 HC RM PRIVATE OB

## 2021-04-28 PROCEDURE — 74011250637 HC RX REV CODE- 250/637: Performed by: OBSTETRICS & GYNECOLOGY

## 2021-04-28 RX ADMIN — PROGESTERONE 200 MG: 100 CAPSULE, LIQUID FILLED ORAL at 21:40

## 2021-04-28 RX ADMIN — Medication 1 TABLET: at 21:40

## 2021-04-28 RX ADMIN — ALUMINUM HYDROXIDE, MAGNESIUM HYDROXIDE, AND SIMETHICONE 30 ML: 200; 200; 20 SUSPENSION ORAL at 11:14

## 2021-04-28 RX ADMIN — ALUMINUM HYDROXIDE, MAGNESIUM HYDROXIDE, AND SIMETHICONE 30 ML: 200; 200; 20 SUSPENSION ORAL at 21:40

## 2021-04-28 NOTE — PROGRESS NOTES
Bedside and Verbal shift change report given to DORYS Sierra (oncoming nurse) by Zulay Barcenas RN (offgoing nurse). Report included the following information SBAR, MAR and Recent Results.

## 2021-04-28 NOTE — PROGRESS NOTES
Ante Partum Progress Note    Risa Rangel  35O4J    Assessment: 33w4d   28 yo [id number] at 35 weeks 4 days admitted with velamentous cord insertion coupled with marginal previa being managed as vasa previa.       1. Vasa previa  - s/p BMZ  - BPP , growth 4#12 c/w 59%ile, on    - daily NST and weekly BPP  - C/S delivery [id number]-45l7x  - per SMFM: If mild, intermittent variables are noted in the setting of otherwise reassuring testing, expectant management is reasonable prior to 34 weeks. However, if the variable decelerations are persistent, the NST is nonreassuring, or vaginal bleeding or contractions develop, then  delivery may be indicated.     2. Hx of C/S: scheduled for repeat 21     3. Hx of Hypothyroidism: labs normal, no meds      4. Hx of  2nd trimester PPROM of twins f/b C/S delivery and demise of one twin d/t NEC   - cont prometrium     5. Rh neg - s/p rhogam at 28wk     6. GBS negative      7. DVT Prophylaxis: ambulating frequently    Orders/Charges: Medium and Non Stress Test    Patient states she has no new complaints She denies contractions or bleeding. She denies LOF or change in discharge. She has active FM. She has no concerns and is ambulating well. Vitals:  Visit Vitals  /70 (BP 1 Location: Left upper arm, BP Patient Position: At rest)   Pulse 83   Temp 98 °F (36.7 °C)   Resp 16   Ht 5' 5\" (1.651 m)   Wt 70.8 kg (156 lb)   SpO2 97%   BMI 25.96 kg/m²     Temp (24hrs), Av.2 °F (36.8 °C), Min:98 °F (36.7 °C), Max:98.5 °F (36.9 °C)      Last 24hr Input/Output:    Intake/Output Summary (Last 24 hours) at 2021 1449  Last data filed at 2021 0857  Gross per 24 hour   Intake 900 ml   Output    Net 900 ml        Non stress test:  Reactive - see below    Patient Vitals for the past 4 hrs: Mode Fetal Heart Rate Variability Decelerations Accelerations RN Reviewed Strip?  Non Stress Test   21 1131 External 140 6-25 BPM None Yes Yes Reactive   21 1110 External 145    Yes       Patient Vitals for the past 4 hrs: Mode Fetal Heart Rate Variability Decelerations Accelerations RN Reviewed Strip? Non Stress Test   04/28/21 1131 External 140 6-25 BPM None Yes Yes Reactive   04/28/21 1110 External 145    Yes         Uterine Activity: None     Exam:  Patient without distress. Abdomen, fundus soft non-tender     Extremities, no redness or tenderness               Additional Exam: Patient without distress, Abdomen soft, non-tender, Fundus soft and non tender, Perineum No sign of blood or amniotic fluid and Lower extremities edema No    Labs:     Lab Results   Component Value Date/Time    WBC 10.0 04/19/2021 07:08 PM    HGB 13.2 04/19/2021 07:08 PM    HCT 39.7 04/19/2021 07:08 PM    PLATELET 695 49/42/4202 07:08 PM       No results found for this or any previous visit (from the past 24 hour(s)).

## 2021-04-28 NOTE — PROGRESS NOTES
-Bedside and Verbal shift change report given to ELIF Pacheco (oncoming nurse) by DORYS Grullon RN  (offgoing nurse). Report included the following information SBAR, Kardex, MAR, Accordion and Recent Results. Client sitting in chair with no complaints at this time. -Client currently in bed with no complaints at this time. PM medications given without difficulty. Client had several questions about what to expect with her scheduled . Opportunity to ask her questions given and answered at this time. 400-CLient resting quietly no distress noted   745-Bedside and Verbal shift change report given to REAL GARCIA RN  (oncoming nurse) by ELIF Redman RN  (offgoing nurse). Report included the following information SBAR, Kardex, MAR and Accordion.

## 2021-04-29 PROCEDURE — 65410000002 HC RM PRIVATE OB

## 2021-04-29 PROCEDURE — 74011250637 HC RX REV CODE- 250/637: Performed by: OBSTETRICS & GYNECOLOGY

## 2021-04-29 PROCEDURE — 59025 FETAL NON-STRESS TEST: CPT

## 2021-04-29 RX ADMIN — ACETAMINOPHEN 650 MG: 325 TABLET ORAL at 12:57

## 2021-04-29 RX ADMIN — ALUMINUM HYDROXIDE, MAGNESIUM HYDROXIDE, AND SIMETHICONE 30 ML: 200; 200; 20 SUSPENSION ORAL at 12:57

## 2021-04-29 RX ADMIN — Medication 1 TABLET: at 21:21

## 2021-04-29 RX ADMIN — ALUMINUM HYDROXIDE, MAGNESIUM HYDROXIDE, AND SIMETHICONE 30 ML: 200; 200; 20 SUSPENSION ORAL at 21:21

## 2021-04-29 RX ADMIN — PROGESTERONE 200 MG: 100 CAPSULE, LIQUID FILLED ORAL at 21:32

## 2021-04-29 RX ADMIN — ACETAMINOPHEN 650 MG: 325 TABLET ORAL at 21:20

## 2021-04-29 NOTE — PROGRESS NOTES
Ante Partum Progress Note    Win Campbell  62I7C    Assessment: 33w5d   28 yo [id number] admitted with velamentous cord insertion coupled with marginal previa being managed as vasa previa.       1. Vasa previa  - s/p BMZ  - BPP , growth 4#12 c/w 59%ile, on    - daily NST and weekly BPP  - C/S delivery 89p0u-39c4h  - per SMFM: If mild, intermittent variables are noted in the setting of otherwise reassuring testing, expectant management is reasonable prior to 34 weeks. However, if the variable decelerations are persistent, the NST is nonreassuring, or vaginal bleeding or contractions develop, then  delivery may be indicated.     2. Hx of C/S: scheduled for repeat 21     3. Hx of Hypothyroidism: labs normal, no meds      4. Hx of  2nd trimester PPROM of twins f/b C/S delivery and demise of one twin d/t NEC   - cont prometrium     5. Rh neg - s/p rhogam at 28wk     6. GBS negative      7. DVT Prophylaxis: ambulating frequently    Orders/Charges: Medium and Non Stress Test    Patient states she has no new complaints She denies contractions or bleeding. She denies LOF or change in discharge. She has active FM. She has no concerns and is ambulating well. Vitals:  Visit Vitals  /73   Pulse 81   Temp 98.4 °F (36.9 °C)   Resp 16   Ht 5' 5\" (1.651 m)   Wt 70.8 kg (156 lb)   SpO2 97%   BMI 25.96 kg/m²     Temp (24hrs), Av.2 °F (36.8 °C), Min:98 °F (36.7 °C), Max:98.4 °F (36.9 °C)      Last 24hr Input/Output:    Intake/Output Summary (Last 24 hours) at 2021 0814  Last data filed at 2021 0857  Gross per 24 hour   Intake 900 ml   Output    Net 900 ml        Non stress test:  Reactive - see below    No data found. No data found. Uterine Activity: None     Exam:  Patient without distress.      Abdomen, fundus soft non-tender     Extremities, no redness or tenderness               Additional Exam: Patient without distress, Abdomen soft, non-tender, Fundus soft and non tender, Perineum No sign of blood or amniotic fluid and Lower extremities edema No    Labs:     Lab Results   Component Value Date/Time    WBC 10.0 04/19/2021 07:08 PM    HGB 13.2 04/19/2021 07:08 PM    HCT 39.7 04/19/2021 07:08 PM    PLATELET 860 11/55/5895 07:08 PM       No results found for this or any previous visit (from the past 24 hour(s)).

## 2021-04-29 NOTE — PROGRESS NOTES
1540 Bedside and Verbal shift change report given to Elba Mcneil RN (oncoming nurse) by Jazmyn Hardwick RN (offgoing nurse). Report included the following information SBAR and Accordion. 2015 Bedside and Verbal shift change report given to [facility] (oncoming nurse) by Elba Mcneil RN (offgoing nurse). Report included the following information SBAR, Procedure Summary, MAR, Accordion and Recent Results.

## 2021-04-29 NOTE — PROGRESS NOTES
MISHA: Transition of Care Plan:  RUR 5%  Disposition: TBD Patient is 35 w4days and chart indicates she will stay in hospital until delivery as she has vasa previa. Patient is scheduled for  2021  PCP follow up: none   OB/GYN following   Case management will continue to monitor and follow for transitions of care needs.

## 2021-04-29 NOTE — PROGRESS NOTES
0815 Bedside and Verbal shift change report given to CHRISTIANA (oncoming nurse) by ELIF BARTON (offgoing nurse). Report included the following information SBAR, Kardex, Intake/Output, MAR, Accordion and Recent Results.

## 2021-04-30 LAB
ABO + RH BLD: NORMAL
BLOOD BANK CMNT PATIENT-IMP: NORMAL
BLOOD GROUP ANTIBODIES SERPL: NORMAL
BLOOD GROUP ANTIBODIES SERPL: NORMAL
SPECIMEN EXP DATE BLD: NORMAL

## 2021-04-30 PROCEDURE — 74011250637 HC RX REV CODE- 250/637: Performed by: OBSTETRICS & GYNECOLOGY

## 2021-04-30 PROCEDURE — 86901 BLOOD TYPING SEROLOGIC RH(D): CPT

## 2021-04-30 PROCEDURE — 36415 COLL VENOUS BLD VENIPUNCTURE: CPT

## 2021-04-30 PROCEDURE — 86870 RBC ANTIBODY IDENTIFICATION: CPT

## 2021-04-30 PROCEDURE — 59025 FETAL NON-STRESS TEST: CPT

## 2021-04-30 PROCEDURE — 65410000002 HC RM PRIVATE OB

## 2021-04-30 RX ORDER — BUTALBITAL, ACETAMINOPHEN AND CAFFEINE 50; 325; 40 MG/1; MG/1; MG/1
1 TABLET ORAL
Status: DISCONTINUED | OUTPATIENT
Start: 2021-04-30 | End: 2021-05-05

## 2021-04-30 RX ADMIN — PROGESTERONE 200 MG: 100 CAPSULE, LIQUID FILLED ORAL at 21:15

## 2021-04-30 RX ADMIN — Medication 1 TABLET: at 21:14

## 2021-04-30 RX ADMIN — ALUMINUM HYDROXIDE, MAGNESIUM HYDROXIDE, AND SIMETHICONE 30 ML: 200; 200; 20 SUSPENSION ORAL at 09:57

## 2021-04-30 RX ADMIN — ALUMINUM HYDROXIDE, MAGNESIUM HYDROXIDE, AND SIMETHICONE 30 ML: 200; 200; 20 SUSPENSION ORAL at 16:44

## 2021-04-30 RX ADMIN — BUTALBITAL, ACETAMINOPHEN, AND CAFFEINE 1 TABLET: 50; 325; 40 TABLET ORAL at 16:44

## 2021-04-30 RX ADMIN — BUTALBITAL, ACETAMINOPHEN, AND CAFFEINE 1 TABLET: 50; 325; 40 TABLET ORAL at 22:47

## 2021-04-30 RX ADMIN — ALUMINUM HYDROXIDE, MAGNESIUM HYDROXIDE, AND SIMETHICONE 30 ML: 200; 200; 20 SUSPENSION ORAL at 22:47

## 2021-04-30 RX ADMIN — BUTALBITAL, ACETAMINOPHEN, AND CAFFEINE 1 TABLET: 50; 325; 40 TABLET ORAL at 10:27

## 2021-04-30 NOTE — PROGRESS NOTES
2010: Bedside and Verbal shift change report given to Walter Alvarez Dr (oncoming nurse) by Isabel Agarwal RN (offgoing nurse). Report included the following information SBAR, Intake/Output, MAR and Recent Results. [phone]: Bedside and Verbal shift change report given to Kwame Santana RN (oncoming nurse) by Bisi Schultz RN (offgoing nurse). Report included the following information SBAR, Intake/Output, MAR and Recent Results.

## 2021-04-30 NOTE — PROGRESS NOTES
Ante Partum Progress Note    Lebronlisa MitchellPowhatan  09H6S    Assessment: 33w6d   26 yo [id number] admitted with velamentous cord insertion coupled with marginal previa being managed as vasa previa.       1. Vasa previa  - s/p BMZ -  - BPP , growth 4#12 c/w 59%ile, on    - daily NST and weekly BPP  - C/S delivery [id number]-[id number]  - per SMFM: If mild, intermittent variables are noted in the setting of otherwise reassuring testing, expectant management is reasonable prior to 34 weeks. However, if the variable decelerations are persistent, the NST is nonreassuring, or vaginal bleeding or contractions develop, then  delivery may be indicated. - will get NICU consult     2. Hx of C/S: scheduled for repeat 21  - reviewed surgical procedure and expectations  - NPO at midnight  and am labs      3. Hx of Hypothyroidism: labs normal, no meds      4. Hx of  2nd trimester PPROM of twins f/b C/S delivery and demise of one twin d/t NEC   - cont prometrium     5. Rh neg - s/p rhogam at 28wk     6. GBS negative      7. DVT Prophylaxis: ambulating frequently    Orders/Charges: Medium and Non Stress Test    Patient states she does have moderate headache     Vitals:  Visit Vitals  /70 (BP 1 Location: Right upper arm, BP Patient Position: Sitting)   Pulse 78   Temp 98.1 °F (36.7 °C)   Resp 16   Ht 5' 5\" (1.651 m)   Wt 70.8 kg (156 lb)   SpO2 97%   BMI 25.96 kg/m²     Temp (24hrs), Av.3 °F (36.8 °C), Min:98.1 °F (36.7 °C), Max:98.5 °F (36.9 °C)      Last 24hr Input/Output:  No intake or output data in the 24 hours ending 21 1013     Non stress test:  Reassuring    An NST was performed and was reactive. The baseline FHR was 145. Moderate baseline variability was noted. Accelerations of sufficient amplitude and duration were noted. There was a single small variable deceleration. Patient Vitals for the past 4 hrs:    Mode Fetal Heart Rate Fetal Activity Variability Decelerations Accelerations Provider who reviewed strip? Non Stress Test   04/30/21 0958 External 145 Present 6-25 BPM Variable Yes clipp Reactive   04/30/21 0936 External 145 Present           Patient Vitals for the past 4 hrs: Mode Fetal Heart Rate Fetal Activity Variability Decelerations Accelerations Provider who reviewed strip? Non Stress Test   04/30/21 0958 External 145 Present 6-25 BPM Variable Yes clipp Reactive   04/30/21 0936 External 145 Present             Uterine Activity: None     Exam:  Patient without distress. Abdomen, fundus soft non-tender     Extremities, no redness or tenderness               Additional Exam: Deferred    Labs:     Lab Results   Component Value Date/Time    WBC 10.0 04/19/2021 07:08 PM    HGB 13.2 04/19/2021 07:08 PM    HCT 39.7 04/19/2021 07:08 PM    PLATELET 207 12/19/2829 07:08 PM       No results found for this or any previous visit (from the past 24 hour(s)).

## 2021-04-30 NOTE — PROGRESS NOTES
Bedside and Verbal shift change report given to BHUMI Buchanan (oncoming nurse) by Gerard Domingo RN (offgoing nurse). Report included the following information SBAR, Kardex, Intake/Output, MAR, Accordion and Recent Results.

## 2021-04-30 NOTE — PROGRESS NOTES
Bedside and Verbal shift change report given to Charles King RN  (oncoming nurse) by CLARITZA Torres RN  (offgoing nurse). Report included the following information SBAR, Kardex, Intake/Output, MAR and Recent Results.

## 2021-04-30 NOTE — PROGRESS NOTES
2345 Report received from MARGARETH Anderson    4/30/21  0740 Bedside shift change report given to [facility] Ainsley (oncoming nurse) by Del Jimenez Rn (offgoing nurse). Report included the following information SBAR, MAR and Med Rec Status.

## 2021-05-01 PROCEDURE — 74011250637 HC RX REV CODE- 250/637: Performed by: OBSTETRICS & GYNECOLOGY

## 2021-05-01 PROCEDURE — 59025 FETAL NON-STRESS TEST: CPT

## 2021-05-01 PROCEDURE — 65410000002 HC RM PRIVATE OB

## 2021-05-01 RX ADMIN — Medication 1 TABLET: at 20:57

## 2021-05-01 RX ADMIN — PROGESTERONE 200 MG: 100 CAPSULE, LIQUID FILLED ORAL at 21:02

## 2021-05-01 RX ADMIN — BUTALBITAL, ACETAMINOPHEN, AND CAFFEINE 1 TABLET: 50; 325; 40 TABLET ORAL at 15:46

## 2021-05-01 RX ADMIN — BUTALBITAL, ACETAMINOPHEN, AND CAFFEINE 1 TABLET: 50; 325; 40 TABLET ORAL at 08:52

## 2021-05-01 RX ADMIN — ALUMINUM HYDROXIDE, MAGNESIUM HYDROXIDE, AND SIMETHICONE 30 ML: 200; 200; 20 SUSPENSION ORAL at 22:31

## 2021-05-01 RX ADMIN — BUTALBITAL, ACETAMINOPHEN, AND CAFFEINE 1 TABLET: 50; 325; 40 TABLET ORAL at 22:31

## 2021-05-01 NOTE — PROGRESS NOTES
Addendum with NST from 10:45am 21    An NST was performed and was reactive. The baseline FHR was 140. Moderate baseline  variability was noted. Accelerations of sufficient amplitude and duration were noted. There were no decelerations noted. No contractions. Ante Partum Progress Note    Gilmore Stains  34w0d    Assessment: 34w0d   28 yo [id number] admitted with velamentous cord insertion coupled with marginal previa being managed as vasa previa.       1. Vasa previa  - s/p BMZ -  - BPP , growth 4#12 c/w 59%ile, on    - daily NST and weekly BPP  - C/S delivery [id number]-25r5i  - per SMFM: If mild, intermittent variables are noted in the setting of otherwise reassuring testing, expectant management is reasonable prior to 34 weeks. However, if the variable decelerations are persistent, the NST is nonreassuring, or vaginal bleeding or contractions develop, then  delivery may be indicated. - will get NICU consult     2. Hx of C/S: scheduled for repeat 21  - reviewed surgical procedure and expectations  - NPO at midnight  and am labs      3. Hx of Hypothyroidism: labs normal, no meds      4. Hx of  2nd trimester PPROM of twins f/b C/S delivery and demise of one twin d/t NEC   - cont prometrium     5. Rh neg - s/p rhogam at 28wk     6. GBS negative      7. DVT Prophylaxis: ambulating frequently    Orders/Charges: Medium and NST    Sleeping soundly on rounds so history by RN who notes she had poor sleep last night due to hot room. No other complaints or concerns.     Vitals:  Visit Vitals  BP 99/66 (BP 1 Location: Left upper arm, BP Patient Position: At rest)   Pulse 72   Temp 97.9 °F (36.6 °C)   Resp 16   Ht 5' 5\" (1.651 m)   Wt 70.8 kg (156 lb)   SpO2 96%   BMI 25.96 kg/m²     Temp (24hrs), Av.8 °F (36.6 °C), Min:97.4 °F (36.3 °C), Max:98 °F (36.7 °C)      Last 24hr Input/Output:  No intake or output data in the 24 hours ending 21 7453     Non stress test: pending    Uterine Activity: none    Exam:  Patient without distress.      Abdomen, fundus soft non-tender     Extremities, no redness or tenderness               Additional Exam: Deferred    Labs:     Lab Results   Component Value Date/Time    WBC 10.0 04/19/2021 07:08 PM    HGB 13.2 04/19/2021 07:08 PM    HCT 39.7 04/19/2021 07:08 PM    PLATELET 577 34/71/9202 07:08 PM       Recent Results (from the past 24 hour(s))   TYPE & SCREEN    Collection Time: 04/30/21  9:51 AM   Result Value Ref Range    Crossmatch Expiration 05/03/2021,2359     ABO/Rh(D) O NEGATIVE     Antibody screen POS     Antibody ID NO ADDITIONAL ANTIBODIES DETECTED     Comment Previously identified Anti D passively acquired

## 2021-05-01 NOTE — PROGRESS NOTES
6399 Verbal shift change report given to Daniele Shore RN (oncoming nurse) by Juarez Torres RN (offgoing nurse). Report included the following information SBAR.

## 2021-05-02 PROCEDURE — 74011250637 HC RX REV CODE- 250/637: Performed by: OBSTETRICS & GYNECOLOGY

## 2021-05-02 PROCEDURE — 65410000002 HC RM PRIVATE OB

## 2021-05-02 RX ADMIN — PROGESTERONE 200 MG: 100 CAPSULE, LIQUID FILLED ORAL at 20:37

## 2021-05-02 RX ADMIN — Medication 1 TABLET: at 20:36

## 2021-05-02 RX ADMIN — ALUMINUM HYDROXIDE, MAGNESIUM HYDROXIDE, AND SIMETHICONE 30 ML: 200; 200; 20 SUSPENSION ORAL at 20:37

## 2021-05-02 NOTE — CONSULTS
Neonatology Antepartum Consultation    Name: Hung Roy      Medical Record Number: [id number]      YOB: 1990     Today's Date: May 2, 2021                                                                 Date of Consultation:  May 2, 2021  Time: 7:15 PM  Attending MD:  Niecy CANDELARIA/ALFREDO Whatley MD  Referring Physician: Dr. Ralph Bird  Reason for Consultation:  Scheduled  for  at 34 4/7 weeks. 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 Subjective:     Age: 27 y.o.  Celso Hick: [place]   Gestation age: 34w3d      Maternal steroids:   and      Objective:     Medications:   Current Facility-Administered Medications   Medication Dose Route Frequency    butalbital-acetaminophen-caffeine (FIORICET, ESGIC) -40 mg per tablet 1 Tab  1 Tab Oral Q6H PRN    acetaminophen (TYLENOL) tablet 650 mg  650 mg Oral Q4H PRN    prenatal vit-iron fumarate-fa (PRENATAL PLUS with IRON) tablet 1 Tab  1 Tab Oral DAILY    progesterone (PROMETRIUM) capsule 200 mg  200 mg Vaginal QHS    alum-mag hydroxide-simeth (MYLANTA) oral suspension 30 mL  30 mL Oral Q4H PRN     Rupture of Membrane: Membranes  Membrane Status: Intact   Meconium Stained:       Data Review:  Lab:   Lab Results   Component Value Date/Time    ABO/Rh(D) O NEGATIVE 2021 09:51 AM    Antibody screen POS 2021 09:51 AM    Rubella, External IMMUNE 2020    GrBStrep, External Negative 2021    HBsAg, External NEGATIVE 2020    HIV, External NON-REACTIVE 2020    Chlamydia, External NEGATIVE 2020    ABO,Rh O NEGATIVE 2020     Last Ultrasound:, female  Last Estimated Fetal Weight: 4# 12 oz (59th percentile)     Assessment: 26 YO [id number] mother admitted on  for inpatient management of marginal previa being treated as a vasa previa with velamentous cord insertion. Betamethasone given  and . Mother O negative with all negative maternal labs. Planned  for . Active Problems:    Placental abnormality (2021)        OB Concerns/Plan:     Approximate survival statistics in overall population at this Gestation Age 34w3d    [x]   Explained limitation of statistics to parents    Common problems at this Gestation Age: 34w1d: RDS, feeding issues, jaundice, temperature instability, hypoglycemia     However, not limited to the above. [x]    Explained NICU coverage and team approach  [x]    Answered question  []    Offered tour  []    Obtained consents  [x]    Discussed Benefits of Breast Feeding- mother will try to provide colostrum but plans to formula feed   []    Discussed the Parent Progress note    Thank you for the consult! Greater than 50% of time spent in direct face to face consultation with mother. Signed:  Shantelle CANDELARIA/ALFREDO Contreras MD  Date:   2021  Time:   7139-3104

## 2021-05-02 NOTE — PROGRESS NOTES
Ante Partum Progress Note    Bernardino Nagy  49A1Y    Assessment: 34w1d   28 yo [id number] admitted with velamentous cord insertion coupled with marginal previa being managed as vasa previa.       1. Vasa previa  - s/p BMZ -  - BPP , growth 4#12 c/w 59%ile, on    - daily NST and weekly BPP  - C/S delivery [id number]  - per SMFM: If mild, intermittent variables are noted in the setting of otherwise reassuring testing, expectant management is reasonable prior to 34 weeks. However, if the variable decelerations are persistent, the NST is nonreassuring, or vaginal bleeding or contractions develop, then  delivery may be indicated. - NICU consult called - hasn't seen patient yet     2. Hx of C/S: scheduled for repeat 21  - reviewed surgical procedure and expectations  - T&S due 5/3  - NPO at midnight  and am CBC      3. Hx of Hypothyroidism: labs normal, no meds      4. Hx of  2nd trimester PPROM of twins f/b C/S delivery and demise of one twin d/t NEC   - cont prometrium     5. Rh neg - s/p rhogam at 28wk     6. GBS negative      7. DVT Prophylaxis: ambulating frequently      Orders/Charges: Medium and Non Stress Test    Patient states she has continued mild HA but fioricet helps. No VB, LOF or contractions. +FM. Vitals:  Visit Vitals  /70 (BP 1 Location: Left upper arm, BP Patient Position: At rest;Sitting)   Pulse 73   Temp 98.4 °F (36.9 °C)   Resp 14   Ht 5' 5\" (1.651 m)   Wt 70.8 kg (156 lb)   SpO2 98%   BMI 25.96 kg/m²     Temp (24hrs), Av.3 °F (36.8 °C), Min:98 °F (36.7 °C), Max:98.4 °F (36.9 °C)      Last 24hr Input/Output:  No intake or output data in the 24 hours ending 21 1021     Non stress test:  Reactive    An NST was performed and was reactive. The baseline FHR was 140. Moderate baseline  variability was noted. Accelerations of sufficient amplitude and duration were noted. There were no decelerations noted.       Uterine Activity: None     Exam: Patient without distress. Abdomen, fundus soft non-tender     Extremities, no redness or tenderness               Additional Exam: Deferred    Labs:     Lab Results   Component Value Date/Time    WBC 10.0 04/19/2021 07:08 PM    HGB 13.2 04/19/2021 07:08 PM    HCT 39.7 04/19/2021 07:08 PM    PLATELET 729 19/35/6959 07:08 PM       No results found for this or any previous visit (from the past 24 hour(s)).

## 2021-05-02 NOTE — PROGRESS NOTES
0054 Verbal shift change report given to Adama Bang RN (oncoming nurse) by Terrence Sexton RN (offgoing nurse). Report included the following information SBAR.

## 2021-05-02 NOTE — PROGRESS NOTES
Bedside and Verbal shift change report given to BHUMI Xiong (oncoming nurse) by Olivia Gunderson RN (offgoing nurse). Report included the following information SBAR, Kardex, Intake/Output, MAR, Accordion and Recent Results.

## 2021-05-03 ENCOUNTER — ANESTHESIA EVENT (OUTPATIENT)
Dept: LABOR AND DELIVERY | Age: 31
End: 2021-05-03
Payer: COMMERCIAL

## 2021-05-03 PROCEDURE — 59025 FETAL NON-STRESS TEST: CPT

## 2021-05-03 PROCEDURE — 76819 FETAL BIOPHYS PROFIL W/O NST: CPT | Performed by: OBSTETRICS & GYNECOLOGY

## 2021-05-03 PROCEDURE — 86900 BLOOD TYPING SEROLOGIC ABO: CPT

## 2021-05-03 PROCEDURE — 74011250637 HC RX REV CODE- 250/637: Performed by: OBSTETRICS & GYNECOLOGY

## 2021-05-03 PROCEDURE — 36415 COLL VENOUS BLD VENIPUNCTURE: CPT

## 2021-05-03 PROCEDURE — 86870 RBC ANTIBODY IDENTIFICATION: CPT

## 2021-05-03 PROCEDURE — 65410000002 HC RM PRIVATE OB

## 2021-05-03 RX ADMIN — Medication 1 TABLET: at 20:51

## 2021-05-03 RX ADMIN — BUTALBITAL, ACETAMINOPHEN, AND CAFFEINE 1 TABLET: 50; 325; 40 TABLET ORAL at 10:01

## 2021-05-03 RX ADMIN — ALUMINUM HYDROXIDE, MAGNESIUM HYDROXIDE, AND SIMETHICONE 30 ML: 200; 200; 20 SUSPENSION ORAL at 22:17

## 2021-05-03 RX ADMIN — PROGESTERONE 200 MG: 100 CAPSULE, LIQUID FILLED ORAL at 20:51

## 2021-05-03 NOTE — PROGRESS NOTES
Bedside and Verbal shift change report given to BHUMI Morel (oncoming nurse) by Karin Wetzel RN (offgoing nurse). Report included the following information SBAR, Kardex, Intake/Output, MAR, Accordion and Recent Results.

## 2021-05-03 NOTE — PROGRESS NOTES
Comprehensive Nutrition Assessment    Type and Reason for Visit: ANNALEE nutrition re-screen/LOS    Nutrition Recommendations/Plan:     Continue with current diet. Nutrition Assessment:      Pt admitted with velamentous cord insertion coupled with marginal previa being managed as vasa previa.    GA: 34w2d.  scheduled for . Pt with good appetite and consuming meals well. Family providing favorite snacks. No new weight. Last Ultrasound:, female  Last Estimated Fetal Weight: 4# 12 oz (59th percentile)       Malnutrition Assessment:  Malnutrition Status:  No malnutrition      Estimated Daily Nutrient Needs:  Energy (kcal): 2100 2300 kcal/day; Weight Used for Energy Requirements: Pre-pregnancy  Protein (g): 60 g/day; Weight Used for Protein Requirements: Pre-pregnancy  Fluid (ml/day): 2100; Method Used for Fluid Requirements: 1 ml/kcal      Wounds:    None       Current Nutrition Therapies:  DIET REGULAR    Anthropometric Measures:  · Height:  5' 5\" (165.1 cm)  · Current Body Wt:  70.8 kg (156 lb)   · Admission Body Wt:  156 lb    · Usual Body Wt:  61.2 kg (135 lb)  Pre-gravid   · Ideal Body Wt:  125 lbs:        Nutrition Diagnosis:   · Increased nutrient needs related to increased demand for energy/nutrients as evidenced by pregnancy. Nutrition Interventions:   Food and/or Nutrient Delivery: Continue current diet  Nutrition Education and Counseling: No recommendations at this time  Coordination of Nutrition Care: Continue to monitor while inpatient    Goals:  Consume 80-90% meals and snacks x 5-7 days. Nutrition Monitoring and Evaluation:   Behavioral-Environmental Outcomes: None identified  Food/Nutrient Intake Outcomes: Diet advancement/tolerance, Food and nutrient intake  Physical Signs/Symptoms Outcomes: Biochemical data, GI status, Weight    Discharge Planning:     Too soon to determine     Electronically signed by Jayne Guevara RD on 5/3/2021 at 3:20 PM    Contact: [employer]

## 2021-05-03 NOTE — PROGRESS NOTES
Ante Partum Progress Note    Geovany Lawson  91B0W    Assessment: 34w2d   28 yo [id number] admitted with velamentous cord insertion coupled with marginal previa being managed as vasa previa.       1. Vasa previa  - s/p BMZ -  - BPP , growth 4#12 c/w 59%ile, on    - daily NST and weekly BPP  - C/S delivery [id number]-[id number]  - per SMFM: If mild, intermittent variables are noted in the setting of otherwise reassuring testing, expectant management is reasonable prior to 34 weeks. However, if the variable decelerations are persistent, the NST is nonreassuring, or vaginal bleeding or contractions develop, then  delivery may be indicated. - S/p NICU consult      2. Hx of C/S: scheduled for repeat 21  - reviewed surgical procedure and expectations  - T&S due 5/3  - NPO at midnight  and am CBC      3. Hx of Hypothyroidism: labs normal, no meds      4. Hx of  2nd trimester PPROM of twins f/b C/S delivery and demise of one twin d/t NEC   - cont prometrium     5. Rh neg - s/p rhogam at 28wk     6. GBS negative      7. DVT Prophylaxis: ambulating frequently      Orders/Charges: Medium and Non Stress Test    Patient states she has continued mild HA but fioricet helps. No VB, LOF or contractions. +FM. Excited for delivery this week! Vitals:  Visit Vitals  /70   Pulse 76   Temp 98 °F (36.7 °C)   Resp 16   Ht 5' 5\" (1.651 m)   Wt 70.8 kg (156 lb)   SpO2 97%   BMI 25.96 kg/m²     Temp (24hrs), Av.1 °F (36.7 °C), Min:97.6 °F (36.4 °C), Max:98.4 °F (36.9 °C)      Last 24hr Input/Output:  No intake or output data in the 24 hours ending 21 0842     Non stress test:  Reactive    An NST was performed and was reactive. The baseline FHR was 140. Moderate baseline  variability was noted. Accelerations of sufficient amplitude and duration were noted. There were no decelerations noted. Uterine Activity: None     Exam:  Patient without distress.      Abdomen, fundus soft non-tender Extremities, no redness or tenderness               Additional Exam: Deferred    Labs:     Lab Results   Component Value Date/Time    WBC 10.0 04/19/2021 07:08 PM    HGB 13.2 04/19/2021 07:08 PM    HCT 39.7 04/19/2021 07:08 PM    PLATELET 592 01/54/6802 07:08 PM       No results found for this or any previous visit (from the past 24 hour(s)).

## 2021-05-03 NOTE — PROGRESS NOTES
Bedside and Verbal shift change report given to Georgina Byrd RN (oncoming nurse) by Jericho Dsouza. Melly Keith RN  (offgoing nurse). Report included the following information SBAR, Kardex, Intake/Output, MAR and Recent Results. Pt resting comfortably.      8461- pt ambulated off unit to [facility] appointment

## 2021-05-03 NOTE — PROGRESS NOTES
Transition of Care Plan:        · RUR:   6%  GLOS:  1.9  LOS:  11  · Dx: Antepartum 34 w1d  Vasa Previa  · OB Attending, Neonatology following  · Plan: Will remain hospitalized until delivery  · Disposition:  Return to home, family to transport    OB anticipates  delivery 34-37 weeks, daily NST. Scheduled  .     Janie Kim RN, BSN, [facility]  ED Care Management  [phone]

## 2021-05-04 PROCEDURE — 59025 FETAL NON-STRESS TEST: CPT

## 2021-05-04 PROCEDURE — 65410000002 HC RM PRIVATE OB

## 2021-05-04 PROCEDURE — 74011250637 HC RX REV CODE- 250/637: Performed by: OBSTETRICS & GYNECOLOGY

## 2021-05-04 RX ADMIN — ALUMINUM HYDROXIDE, MAGNESIUM HYDROXIDE, AND SIMETHICONE 30 ML: 200; 200; 20 SUSPENSION ORAL at 21:18

## 2021-05-04 RX ADMIN — Medication 1 TABLET: at 21:18

## 2021-05-04 NOTE — PROGRESS NOTES
Ante Partum Progress Note    Risa Rangel  20I2O    Assessment: 34w3d   28 yo [id number] admitted with velamentous cord insertion coupled with marginal previa being managed as vasa previa.       1. Vasa previa  - s/p BMZ -20  - BPP 8/8 on 5/3 , growth 4#12 c/w 59%ile, on    - daily NST and weekly BPP  - C/S delivery [id number]-[id number]  - per SMFM: If mild, intermittent variables are noted in the setting of otherwise reassuring testing, expectant management is reasonable prior to 34 weeks. However, if the variable decelerations are persistent, the NST is nonreassuring, or vaginal bleeding or contractions develop, then  delivery may be indicated. - S/p NICU consult      2. Hx of C/S: scheduled for repeat 21  -previously reviewed surgical procedure and expectations  - T&S done 5/3  - NPO at midnight  and am CBC  ordered      3. Hx of Hypothyroidism: labs normal, no meds      4. Hx of  2nd trimester PPROM of twins f/b C/S delivery and demise of one twin d/t NEC   - cont prometrium     5. Rh neg - s/p rhogam at 28wk     6. GBS negative      7. DVT Prophylaxis: ambulating frequently        Orders/Charges: Medium and Non Stress Test    Patient states she has no new complaints. She denies bleeding or contractions. Ready for delivery tomorrow. Vitals:  Visit Vitals  /64 (BP 1 Location: Left arm, BP Patient Position: At rest)   Pulse 63   Temp 98.6 °F (37 °C)   Resp 16   Ht 5' 5\" (1.651 m)   Wt 70.8 kg (156 lb)   SpO2 99%   BMI 25.96 kg/m²     Temp (24hrs), Av.2 °F (36.8 °C), Min:97.7 °F (36.5 °C), Max:98.6 °F (37 °C)      Last 24hr Input/Output:  No intake or output data in the 24 hours ending 21 1010     Non stress test:  Reactive   140's/ mod/+ accels/ no decels    Patient Vitals for the past 4 hrs: Mode Fetal Heart Rate Variability Decelerations Accelerations RN Reviewed Strip?  Non Stress Test   21 0936 External 135 6-25 BPM None Yes Yes Reactive      Patient Vitals for the past 4 hrs: Mode Fetal Heart Rate Variability Decelerations Accelerations RN Reviewed Strip? Non Stress Test   05/04/21 0936 External 135 6-25 BPM None Yes Yes Reactive        Uterine Activity: None     Exam:  Patient without distress.      Abdomen, fundus soft non-tender     Extremities, no redness or tenderness               Additional Exam: Deferred    Labs:     Lab Results   Component Value Date/Time    WBC 10.0 04/19/2021 07:08 PM    HGB 13.2 04/19/2021 07:08 PM    HCT 39.7 04/19/2021 07:08 PM    PLATELET 744 84/00/7028 07:08 PM       Recent Results (from the past 24 hour(s))   TYPE & SCREEN    Collection Time: 05/03/21  4:29 PM   Result Value Ref Range    Crossmatch Expiration 05/06/2021,2359     ABO/Rh(D) O NEGATIVE     Antibody screen POS     Antibody ID NO ADDITIONAL ANTIBODIES DETECTED     Comment Previously identified Anti D passively acquired

## 2021-05-04 NOTE — PROGRESS NOTES
Bedside and Verbal shift change report given to DORYS Cuevas RN (oncoming nurse) by Holly Conteh RN (offgoing nurse). Report included the following information SBAR, Kardex, Intake/Output, MAR, Accordion and Recent Results.

## 2021-05-04 NOTE — PROGRESS NOTES
Bedside shift change report given to Ravi Cornejo RN and VIPUL Dallas (oncoming nurse) by Dayana Marino RN (offgoing nurse). Report included the following information SBAR, Kardex, Intake/Output, MAR and Recent Results.

## 2021-05-04 NOTE — PROGRESS NOTES
1945 Bedside and Verbal shift change report given to SARAH Dawson by Gabby Ding RN. Report included the following information SBAR, Intake/Output and MAR.     0000 Bedside and Verbal shift change report given to BHUMI Sibley RN by Adrian Birmingham. Nathaniel Gupta RN. Report included the following information SBAR, Intake/Output and MAR.

## 2021-05-05 ENCOUNTER — ANESTHESIA (OUTPATIENT)
Dept: LABOR AND DELIVERY | Age: 31
End: 2021-05-05
Payer: COMMERCIAL

## 2021-05-05 LAB
ERYTHROCYTE [DISTWIDTH] IN BLOOD BY AUTOMATED COUNT: 14.2 % (ref 11.5–14.5)
HCT VFR BLD AUTO: 40.3 % (ref 35–47)
HGB BLD-MCNC: 13 G/DL (ref 11.5–16)
MCH RBC QN AUTO: 28.1 PG (ref 26–34)
MCHC RBC AUTO-ENTMCNC: 32.3 G/DL (ref 30–36.5)
MCV RBC AUTO: 87.2 FL (ref 80–99)
NRBC # BLD: 0 K/UL (ref 0–0.01)
NRBC BLD-RTO: 0 PER 100 WBC
PLATELET # BLD AUTO: 146 K/UL (ref 150–400)
RBC # BLD AUTO: 4.62 M/UL (ref 3.8–5.2)
WBC # BLD AUTO: 9.1 K/UL (ref 3.6–11)

## 2021-05-05 PROCEDURE — 74011000250 HC RX REV CODE- 250: Performed by: OBSTETRICS & GYNECOLOGY

## 2021-05-05 PROCEDURE — 74011000258 HC RX REV CODE- 258: Performed by: NURSE ANESTHETIST, CERTIFIED REGISTERED

## 2021-05-05 PROCEDURE — 74011000250 HC RX REV CODE- 250: Performed by: NURSE ANESTHETIST, CERTIFIED REGISTERED

## 2021-05-05 PROCEDURE — 77030002933 HC SUT MCRYL J&J -A

## 2021-05-05 PROCEDURE — 74011250636 HC RX REV CODE- 250/636: Performed by: ANESTHESIOLOGY

## 2021-05-05 PROCEDURE — 77030018842 HC SOL IRR SOD CL 9% BAXT -A

## 2021-05-05 PROCEDURE — 77030018831 HC SOL IRR H20 BAXT -A

## 2021-05-05 PROCEDURE — 88307 TISSUE EXAM BY PATHOLOGIST: CPT

## 2021-05-05 PROCEDURE — 36415 COLL VENOUS BLD VENIPUNCTURE: CPT

## 2021-05-05 PROCEDURE — 65410000002 HC RM PRIVATE OB

## 2021-05-05 PROCEDURE — 77030040361 HC SLV COMPR DVT MDII -B

## 2021-05-05 PROCEDURE — 74011000250 HC RX REV CODE- 250: Performed by: ANESTHESIOLOGY

## 2021-05-05 PROCEDURE — 77030013079 HC BLNKT BAIR HGGR 3M -A: Performed by: ANESTHESIOLOGY

## 2021-05-05 PROCEDURE — 77030040012

## 2021-05-05 PROCEDURE — 76010000392 HC C SECN EA ADDL 0.5 HR: Performed by: OBSTETRICS & GYNECOLOGY

## 2021-05-05 PROCEDURE — 77030031139 HC SUT VCRL2 J&J -A

## 2021-05-05 PROCEDURE — 85027 COMPLETE CBC AUTOMATED: CPT

## 2021-05-05 PROCEDURE — 76060000078 HC EPIDURAL ANESTHESIA: Performed by: OBSTETRICS & GYNECOLOGY

## 2021-05-05 PROCEDURE — 77030008459 HC STPLR SKN COOP -B

## 2021-05-05 PROCEDURE — 75410000003 HC RECOV DEL/VAG/CSECN EA 0.5 HR: Performed by: OBSTETRICS & GYNECOLOGY

## 2021-05-05 PROCEDURE — 2709999900 HC NON-CHARGEABLE SUPPLY

## 2021-05-05 PROCEDURE — 76010000391 HC C SECN FIRST 1 HR: Performed by: OBSTETRICS & GYNECOLOGY

## 2021-05-05 PROCEDURE — 77030011220 HC DEV ELECSURG COVD -B

## 2021-05-05 PROCEDURE — 74011250636 HC RX REV CODE- 250/636: Performed by: OBSTETRICS & GYNECOLOGY

## 2021-05-05 RX ORDER — DIPHENHYDRAMINE HCL 25 MG
25 CAPSULE ORAL
Status: DISCONTINUED | OUTPATIENT
Start: 2021-05-05 | End: 2021-05-07 | Stop reason: HOSPADM

## 2021-05-05 RX ORDER — AMMONIA 15 % (W/V)
1 AMPUL (EA) INHALATION AS NEEDED
Status: DISCONTINUED | OUTPATIENT
Start: 2021-05-05 | End: 2021-05-07 | Stop reason: HOSPADM

## 2021-05-05 RX ORDER — KETOROLAC TROMETHAMINE 30 MG/ML
INJECTION, SOLUTION INTRAMUSCULAR; INTRAVENOUS AS NEEDED
Status: DISCONTINUED | OUTPATIENT
Start: 2021-05-05 | End: 2021-05-05 | Stop reason: HOSPADM

## 2021-05-05 RX ORDER — DOCUSATE SODIUM 100 MG/1
100 CAPSULE, LIQUID FILLED ORAL
Status: DISCONTINUED | OUTPATIENT
Start: 2021-05-05 | End: 2021-05-07 | Stop reason: HOSPADM

## 2021-05-05 RX ORDER — MORPHINE SULFATE 10 MG/ML
10 INJECTION, SOLUTION INTRAMUSCULAR; INTRAVENOUS
Status: ACTIVE | OUTPATIENT
Start: 2021-05-05 | End: 2021-05-06

## 2021-05-05 RX ORDER — DIPHENHYDRAMINE HYDROCHLORIDE 50 MG/ML
12.5 INJECTION, SOLUTION INTRAMUSCULAR; INTRAVENOUS
Status: DISCONTINUED | OUTPATIENT
Start: 2021-05-05 | End: 2021-05-07 | Stop reason: HOSPADM

## 2021-05-05 RX ORDER — OXYCODONE AND ACETAMINOPHEN 5; 325 MG/1; MG/1
2 TABLET ORAL
Status: DISCONTINUED | OUTPATIENT
Start: 2021-05-05 | End: 2021-05-07 | Stop reason: HOSPADM

## 2021-05-05 RX ORDER — OXYTOCIN/RINGER'S LACTATE 30/500 ML
PLASTIC BAG, INJECTION (ML) INTRAVENOUS
Status: DISCONTINUED | OUTPATIENT
Start: 2021-05-05 | End: 2021-05-05 | Stop reason: HOSPADM

## 2021-05-05 RX ORDER — SODIUM CHLORIDE, SODIUM LACTATE, POTASSIUM CHLORIDE, CALCIUM CHLORIDE 600; 310; 30; 20 MG/100ML; MG/100ML; MG/100ML; MG/100ML
125 INJECTION, SOLUTION INTRAVENOUS CONTINUOUS
Status: DISCONTINUED | OUTPATIENT
Start: 2021-05-05 | End: 2021-05-07 | Stop reason: HOSPADM

## 2021-05-05 RX ORDER — ONDANSETRON 2 MG/ML
4 INJECTION INTRAMUSCULAR; INTRAVENOUS
Status: DISCONTINUED | OUTPATIENT
Start: 2021-05-05 | End: 2021-05-07 | Stop reason: HOSPADM

## 2021-05-05 RX ORDER — PROGESTERONE 200 MG/1
200 CAPSULE ORAL DAILY
COMMUNITY
End: 2021-05-07

## 2021-05-05 RX ORDER — ACETAMINOPHEN 325 MG/1
650 TABLET ORAL
Status: DISCONTINUED | OUTPATIENT
Start: 2021-05-05 | End: 2021-05-07 | Stop reason: HOSPADM

## 2021-05-05 RX ORDER — ONDANSETRON 2 MG/ML
4 INJECTION INTRAMUSCULAR; INTRAVENOUS
Status: ACTIVE | OUTPATIENT
Start: 2021-05-05 | End: 2021-05-06

## 2021-05-05 RX ORDER — MORPHINE SULFATE 0.5 MG/ML
INJECTION, SOLUTION EPIDURAL; INTRATHECAL; INTRAVENOUS
Status: COMPLETED | OUTPATIENT
Start: 2021-05-05 | End: 2021-05-05

## 2021-05-05 RX ORDER — SIMETHICONE 80 MG
80 TABLET,CHEWABLE ORAL
Status: DISCONTINUED | OUTPATIENT
Start: 2021-05-05 | End: 2021-05-07 | Stop reason: HOSPADM

## 2021-05-05 RX ORDER — KETOROLAC TROMETHAMINE 30 MG/ML
30 INJECTION, SOLUTION INTRAMUSCULAR; INTRAVENOUS
Status: DISPENSED | OUTPATIENT
Start: 2021-05-05 | End: 2021-05-06

## 2021-05-05 RX ORDER — ONDANSETRON 2 MG/ML
INJECTION INTRAMUSCULAR; INTRAVENOUS AS NEEDED
Status: DISCONTINUED | OUTPATIENT
Start: 2021-05-05 | End: 2021-05-05 | Stop reason: HOSPADM

## 2021-05-05 RX ORDER — IBUPROFEN 400 MG/1
800 TABLET ORAL EVERY 8 HOURS
Status: DISCONTINUED | OUTPATIENT
Start: 2021-05-05 | End: 2021-05-07 | Stop reason: HOSPADM

## 2021-05-05 RX ORDER — SODIUM CHLORIDE, SODIUM LACTATE, POTASSIUM CHLORIDE, CALCIUM CHLORIDE 600; 310; 30; 20 MG/100ML; MG/100ML; MG/100ML; MG/100ML
INJECTION, SOLUTION INTRAVENOUS
Status: DISCONTINUED | OUTPATIENT
Start: 2021-05-05 | End: 2021-05-05 | Stop reason: HOSPADM

## 2021-05-05 RX ORDER — OXYTOCIN/RINGER'S LACTATE 30/500 ML
PLASTIC BAG, INJECTION (ML) INTRAVENOUS
Status: COMPLETED
Start: 2021-05-05 | End: 2021-05-05

## 2021-05-05 RX ORDER — SODIUM CHLORIDE 0.9 % (FLUSH) 0.9 %
5-40 SYRINGE (ML) INJECTION EVERY 8 HOURS
Status: DISCONTINUED | OUTPATIENT
Start: 2021-05-05 | End: 2021-05-07 | Stop reason: HOSPADM

## 2021-05-05 RX ORDER — OXYCODONE AND ACETAMINOPHEN 5; 325 MG/1; MG/1
1 TABLET ORAL
Status: DISCONTINUED | OUTPATIENT
Start: 2021-05-05 | End: 2021-05-07 | Stop reason: HOSPADM

## 2021-05-05 RX ORDER — HYDROCORTISONE 1 %
CREAM (GRAM) TOPICAL AS NEEDED
Status: DISCONTINUED | OUTPATIENT
Start: 2021-05-05 | End: 2021-05-07 | Stop reason: HOSPADM

## 2021-05-05 RX ORDER — MORPHINE SULFATE 10 MG/ML
6 INJECTION, SOLUTION INTRAMUSCULAR; INTRAVENOUS
Status: ACTIVE | OUTPATIENT
Start: 2021-05-05 | End: 2021-05-06

## 2021-05-05 RX ORDER — OXYTOCIN/RINGER'S LACTATE 30/500 ML
10 PLASTIC BAG, INJECTION (ML) INTRAVENOUS AS NEEDED
Status: DISCONTINUED | OUTPATIENT
Start: 2021-05-05 | End: 2021-05-07 | Stop reason: HOSPADM

## 2021-05-05 RX ORDER — OXYTOCIN/RINGER'S LACTATE 30/500 ML
87.3 PLASTIC BAG, INJECTION (ML) INTRAVENOUS AS NEEDED
Status: DISCONTINUED | OUTPATIENT
Start: 2021-05-05 | End: 2021-05-07 | Stop reason: HOSPADM

## 2021-05-05 RX ORDER — EPHEDRINE SULFATE/0.9% NACL/PF 50 MG/5 ML
SYRINGE (ML) INTRAVENOUS AS NEEDED
Status: DISCONTINUED | OUTPATIENT
Start: 2021-05-05 | End: 2021-05-05 | Stop reason: HOSPADM

## 2021-05-05 RX ORDER — OXYTOCIN 10 [USP'U]/ML
INJECTION, SOLUTION INTRAMUSCULAR; INTRAVENOUS AS NEEDED
Status: DISCONTINUED | OUTPATIENT
Start: 2021-05-05 | End: 2021-05-07 | Stop reason: HOSPADM

## 2021-05-05 RX ORDER — SODIUM CHLORIDE 0.9 % (FLUSH) 0.9 %
5-40 SYRINGE (ML) INJECTION AS NEEDED
Status: DISCONTINUED | OUTPATIENT
Start: 2021-05-05 | End: 2021-05-07 | Stop reason: HOSPADM

## 2021-05-05 RX ORDER — BUPIVACAINE HYDROCHLORIDE 7.5 MG/ML
INJECTION, SOLUTION EPIDURAL; RETROBULBAR
Status: COMPLETED | OUTPATIENT
Start: 2021-05-05 | End: 2021-05-05

## 2021-05-05 RX ADMIN — SODIUM CHLORIDE, POTASSIUM CHLORIDE, SODIUM LACTATE AND CALCIUM CHLORIDE 1000 ML: 600; 310; 30; 20 INJECTION, SOLUTION INTRAVENOUS at 06:50

## 2021-05-05 RX ADMIN — ONDANSETRON HYDROCHLORIDE 4 MG: 2 INJECTION, SOLUTION INTRAMUSCULAR; INTRAVENOUS at 08:03

## 2021-05-05 RX ADMIN — Medication 10 MG: at 08:12

## 2021-05-05 RX ADMIN — WATER 2 G: 1 INJECTION INTRAMUSCULAR; INTRAVENOUS; SUBCUTANEOUS at 07:52

## 2021-05-05 RX ADMIN — MORPHINE SULFATE 1 MG: 0.5 INJECTION, SOLUTION EPIDURAL; INTRATHECAL; INTRAVENOUS at 08:41

## 2021-05-05 RX ADMIN — MORPHINE SULFATE 0.25 MG: 0.5 INJECTION, SOLUTION EPIDURAL; INTRATHECAL; INTRAVENOUS at 08:29

## 2021-05-05 RX ADMIN — KETOROLAC TROMETHAMINE 30 MG: 30 INJECTION, SOLUTION INTRAMUSCULAR; INTRAVENOUS at 21:52

## 2021-05-05 RX ADMIN — DEXMEDETOMIDINE HYDROCHLORIDE 8 MCG: 100 INJECTION, SOLUTION, CONCENTRATE INTRAVENOUS at 09:07

## 2021-05-05 RX ADMIN — OXYTOCIN 909 ML/HR: 10 INJECTION, SOLUTION INTRAMUSCULAR; INTRAVENOUS at 08:28

## 2021-05-05 RX ADMIN — SODIUM CHLORIDE, POTASSIUM CHLORIDE, SODIUM LACTATE AND CALCIUM CHLORIDE: 600; 310; 30; 20 INJECTION, SOLUTION INTRAVENOUS at 07:45

## 2021-05-05 RX ADMIN — SODIUM CHLORIDE, POTASSIUM CHLORIDE, SODIUM LACTATE AND CALCIUM CHLORIDE: 600; 310; 30; 20 INJECTION, SOLUTION INTRAVENOUS at 08:21

## 2021-05-05 RX ADMIN — BUPIVACAINE HYDROCHLORIDE 11 MG: 7.5 INJECTION, SOLUTION EPIDURAL; RETROBULBAR at 08:29

## 2021-05-05 RX ADMIN — KETOROLAC TROMETHAMINE 15 MG: 30 INJECTION, SOLUTION INTRAMUSCULAR; INTRAVENOUS at 08:54

## 2021-05-05 RX ADMIN — PHENYLEPHRINE HYDROCHLORIDE 20 MCG/MIN: 10 INJECTION INTRAVENOUS at 08:04

## 2021-05-05 RX ADMIN — KETOROLAC TROMETHAMINE 30 MG: 30 INJECTION, SOLUTION INTRAMUSCULAR; INTRAVENOUS at 14:58

## 2021-05-05 NOTE — ANESTHESIA PROCEDURE NOTES
Spinal Block    Start time: 5/5/2021 8:02 AM  End time: 5/5/2021 8:09 AM  Performed by: Artur Dyer MD  Authorized by: Artur Dyer MD     Pre-procedure:   Indications: primary anesthetic  Preanesthetic Checklist: patient identified, risks and benefits discussed, anesthesia consent, site marked, patient being monitored and timeout performed    Timeout Time: 08:02          Spinal Block:   Patient Position:  Seated  Prep Region:  Lumbar  Prep: Betadine and patient draped      Location:  L3-4  Technique:  Single shot        Needle:   Needle Type:  Pencan  Needle Gauge:  24 G  Attempts:  1      Events: CSF confirmed, no blood with aspiration and no paresthesia        Assessment:  Insertion:  Uncomplicated  Patient tolerance:  Patient tolerated the procedure well with no immediate complications

## 2021-05-05 NOTE — PROGRESS NOTES
0940 Bedside and Verbal shift change report given to ELIF Hill RN (oncoming nurse) by Pat Chambers RN (offgoing nurse). Report included the following information SBAR, OR Summary, Intake/Output, MAR and Recent Results. 1130 TRANSFER - OUT REPORT:    Verbal report given to MELISSA Phoenix RN(name) on [facility]  being transferred to MIU(unit) for routine progression of care       Report consisted of patients Situation, Background, Assessment and   Recommendations(SBAR). Information from the following report(s) SBAR, OR Summary, Intake/Output, MAR and Recent Results was reviewed with the receiving nurse. Lines:       Opportunity for questions and clarification was provided.       Patient transported with:   Registered Nurse

## 2021-05-05 NOTE — OP NOTES
Operative Note    Name: Bernadrino Nagy   Medical Record Number: [id number]   YOB: 1990  Today's Date: May 5, 2021      Pre-operative Diagnosis: REPEAT  VASA PREVIA    Post-operative Diagnosis: same and delivered    Operation: low transverse  section Procedure(s):   SECTION    Surgeon(s):  Nuno An MD Cline Golden, MD    Anesthesia: Spinal    Prophylactic Antibiotics: Ancef  DVT Prophylaxis: Sequential Compression Devices         Fetal Description: fajardo     Birth Information:   Information for the patient's :  Ramon Maya [[id number]]   Delivery of a   female infant on 2021 at 8:27 AM. Apgars were 3  and 6 . Umbilical Cord: 3 Vessels     Umbilical Cord Events: Nuchal Cord Without Compressions     Placenta: Expressed removal with   appearance. Amniotic Fluid Volume:        Amniotic Fluid Description:           Umbilical Cord: Nuchal Cord x  1    Placenta:  expressed    Estimated Blood Loss (ml):  700ml clinically, QBL pending    Specimens: none           Complications:  None    Findings: liveborn female infant with Apgars 3/6/9, normal maternal anatomy aside from adhesive disease of bladder to lower uterine segment    Procedure Detail:      After proper patient identification and consent, the patient was taken to the operating room, where spinal anesthesia was administered and found to be adequate. Boyle catheter had been placed using sterile technique. The patient was prepped and draped in the normal sterile fashion. The abdomen was entered using the Pfannenstiel technique. The peritoneum was entered sharply well superior to the bladder without any apparent injury. The bladder was visualized well below intended hysterotomy site. A low transverse uterine incision was made with the scalpel and extended with blunt finger dissection. Amniotomy was performed and the fluid was medium amount clear.   The babys head was then delivered atraumatically. The nose and mouth were suctioned. The cord was clamped and cut and the baby was handed off to  staff in attendance. Placenta was then removed from the uterus. The uterus was curettaged with a moist lap pad and cleared of all clots and debris. The uterine incision was closed with 0 vicryl, double layer  in running locking fashion with good hemostasis assured. The uterus was returned to the abdomen. The preperitoneal space over the bladder was denuded so the bladder was backfilled with methylene blue fluid and confirmed intact without any visible spillage. Bladder was then drained. The rectus muscles were hemostatic. The fascia was closed with 0 Vicryl in a running fashion. Subcutaneous tissue was irrigated and hemostasis achieved using the bovie. The skin was closed with an absorbable staple closure. The patient tolerated the procedure well. Sponge, lap, and needle counts were correct times three and the patient and baby were taken to recovery/postpartum room in stable condition.     Lenny Lai MD  May 5, 2021  8:59 AM

## 2021-05-05 NOTE — PROGRESS NOTES
Ante Partum Progress Note    Yumiko Velasquez  83R6F    Assessment: 34w4d     1. Vasa previa  - s/p BMZ -  - BPP 8/8 on 5/3 , growth 4#12 c/w 59%ile on    - S/p NICU consult  - delivery by C/S today     2. Hx of C/S  - repeat today  - consent obtained and all questions answered  - to OR for repeat C/S     3. Hx of Hypothyroidism: labs normal, no meds      4. Hx of  2nd trimester PPROM of twins f/b C/S delivery and demise of one twin d/t NEC      5. Rh neg - s/p rhogam at 28wk     6. GBS negative        Orders/Charges: High and Non Stress Test    Patient states she has no new complaints    Vitals:  Visit Vitals  /67   Pulse 86   Temp 98.5 °F (36.9 °C)   Resp 18   Ht 5' 5\" (1.651 m)   Wt 70.8 kg (156 lb)   SpO2 100%   BMI 25.96 kg/m²     Temp (24hrs), Av.1 °F (36.7 °C), Min:97.3 °F (36.3 °C), Max:98.6 °F (37 °C)      Last 24hr Input/Output:    Intake/Output Summary (Last 24 hours) at 2021 0753  Last data filed at 2021 1602  Gross per 24 hour   Intake 1000 ml   Output    Net 1000 ml        Non stress test:  Reactive  An NST was performed and was reactive. The baseline FHR was 135. Moderate baseline  variability was noted. Accelerations of sufficient amplitude and duration were noted. There were no decelerations noted. Patient Vitals for the past 4 hrs: Mode Fetal Heart Rate Fetal Activity Variability Decelerations Accelerations Non Stress Test   21 0722 External 135 Present 6-25 BPM None Yes Reactive   21 0655 External 140 Present          Patient Vitals for the past 4 hrs: Mode Fetal Heart Rate Fetal Activity Variability Decelerations Accelerations Non Stress Test   21 0722 External 135 Present 6-25 BPM None Yes Reactive   21 0655 External 140 Present            Uterine Activity: None     Exam:  Patient without distress.      Abdomen, fundus soft non-tender     Extremities, no redness or tenderness               Additional Exam: Deferred    Labs: Lab Results   Component Value Date/Time    WBC 10.0 04/19/2021 07:08 PM    HGB 13.2 04/19/2021 07:08 PM    HCT 39.7 04/19/2021 07:08 PM    PLATELET 291 22/90/9185 07:08 PM       No results found for this or any previous visit (from the past 24 hour(s)).

## 2021-05-05 NOTE — ANESTHESIA PREPROCEDURE EVALUATION
Relevant Problems   No relevant active problems       Anesthetic History   No history of anesthetic complications            Review of Systems / Medical History  Patient summary reviewed, nursing notes reviewed and pertinent labs reviewed    Pulmonary  Within defined limits                 Neuro/Psych   Within defined limits           Cardiovascular  Within defined limits                Exercise tolerance: >4 METS     GI/Hepatic/Renal  Within defined limits              Endo/Other  Within defined limits           Other Findings              Physical Exam    Airway  Mallampati: II  TM Distance: > 6 cm  Neck ROM: normal range of motion   Mouth opening: Normal     Cardiovascular  Regular rate and rhythm,  S1 and S2 normal,  no murmur, click, rub, or gallop             Dental  No notable dental hx       Pulmonary  Breath sounds clear to auscultation               Abdominal  GI exam deferred       Other Findings            Anesthetic Plan    ASA: 2  Anesthesia type: spinal      Post-op pain plan if not by surgeon: intrathecal opiates    Induction: Intravenous  Anesthetic plan and risks discussed with: Patient

## 2021-05-05 NOTE — PROGRESS NOTES
1044 SBAR report received from [facility]. Pt transferred ambulatory to [facility] for scheduled C/S.  Tameka applied    8907 Bedside SBAR report to A [facility]

## 2021-05-05 NOTE — PROGRESS NOTES
TRANSFER - IN REPORT:    Verbal report received from ELIF Morales RN(name) on Coleen Blandon  being received from L&D(unit) for routine progression of care      Report consisted of patients Situation, Background, Assessment and   Recommendations(SBAR). Information from the following report(s) SBAR was reviewed with the receiving nurse. Opportunity for questions and clarification was provided. Assessment completed upon patients arrival to unit and care assumed.

## 2021-05-05 NOTE — ANESTHESIA POSTPROCEDURE EVALUATION
Post-Anesthesia Evaluation and Assessment    Patient: Rafael Goff MRN: [id number]  SSN: [id number]    YOB: 1990  Age: 27 y.o. Sex: female      I have evaluated the patient and they are stable and ready for discharge from the PACU. Cardiovascular Function/Vital Signs  Visit Vitals  /72 (BP 1 Location: Right arm, BP Patient Position: At rest)   Pulse 66   Temp 36.5 °C (97.7 °F)   Resp 14   Ht 5' 5\" (1.651 m)   Wt 70.8 kg (156 lb)   SpO2 100%   Breastfeeding Unknown   BMI 25.96 kg/m²       Patient is status post Spinal anesthesia for Procedure(s) with comments:   SECTION - EDC 21. Nausea/Vomiting: None    Postoperative hydration reviewed and adequate. Pain:  Pain Scale 1: Numeric (0 - 10) (21 1254)  Pain Intensity 1: 2 (21 1254)   Managed    Neurological Status:   Neuro (WDL): Within Defined Limits (21 0950)   At baseline    Mental Status, Level of Consciousness: Alert and  oriented to person, place, and time    Pulmonary Status:   O2 Device: None (Room air) (21 1254)   Adequate oxygenation and airway patent    Complications related to anesthesia: None    Post-anesthesia assessment completed. No concerns    Signed By: Abebe Claire MD     May 5, 2021              Procedure(s):   SECTION.     spinal    <BSHSIANPOST>    INITIAL Post-op Vital signs:   Vitals Value Taken Time   /72 21 1254   Temp 36.5 °C (97.7 °F) 21 1254   Pulse 66 21 1254   Resp 14 21 1254   SpO2 100 % 21 1450

## 2021-05-05 NOTE — L&D DELIVERY NOTE
Delivery Summary  Patient: Rafael Goff             Circumcision:   NA-female  Additional Delivery Comments - see op note    Information for the patient's :  Deisy Nunez [[id number]]     Delivery Type: , Low Transverse   Delivery Date: 2021   Delivery Time: 8:27 AM     Birth Weight:       Sex:  female  Delivery Clinician:  Lady Mar   Gestational Age: 31w1d    Presentation: Vertex   Position:             Apgars were 3  and 6      Resuscitation Method: Tactile Stimulation;Suctioning-bulb;Suctioning-deep;C-PAP     Meconium Stained: None    Living Status: Living       Placenta Date/Time: 2021  8:29 AM   Placenta Removal: Expressed   Placenta Appearance:      Cord Information: 3 Vessels    Cord Events: Nuchal Cord Without Compressions       Disposition of Cord Blood: Lab    Blood Gases Sent?:  No       Cord pH:  none    Episiotomy: None   Laceration(s): None     Estimated Blood Loss (ml): 700ml clinically, QBL pending    Labor Events  Method:      Augmentation: None   Cervical Ripening:     None        Operative Vaginal Delivery - none

## 2021-05-06 LAB
BASOPHILS # BLD: 0 K/UL (ref 0–0.1)
BASOPHILS NFR BLD: 0 % (ref 0–1)
DIFFERENTIAL METHOD BLD: ABNORMAL
EOSINOPHIL # BLD: 0.1 K/UL (ref 0–0.4)
EOSINOPHIL NFR BLD: 1 % (ref 0–7)
ERYTHROCYTE [DISTWIDTH] IN BLOOD BY AUTOMATED COUNT: 14 % (ref 11.5–14.5)
HCT VFR BLD AUTO: 34.7 % (ref 35–47)
HGB BLD-MCNC: 11.2 G/DL (ref 11.5–16)
IMM GRANULOCYTES # BLD AUTO: 0.1 K/UL (ref 0–0.04)
IMM GRANULOCYTES NFR BLD AUTO: 1 % (ref 0–0.5)
LYMPHOCYTES # BLD: 1.5 K/UL (ref 0.8–3.5)
LYMPHOCYTES NFR BLD: 14 % (ref 12–49)
MCH RBC QN AUTO: 28.3 PG (ref 26–34)
MCHC RBC AUTO-ENTMCNC: 32.3 G/DL (ref 30–36.5)
MCV RBC AUTO: 87.6 FL (ref 80–99)
MONOCYTES # BLD: 0.6 K/UL (ref 0–1)
MONOCYTES NFR BLD: 6 % (ref 5–13)
NEUTS SEG # BLD: 8.3 K/UL (ref 1.8–8)
NEUTS SEG NFR BLD: 78 % (ref 32–75)
NRBC # BLD: 0 K/UL (ref 0–0.01)
NRBC BLD-RTO: 0 PER 100 WBC
PLATELET # BLD AUTO: 165 K/UL (ref 150–400)
PMV BLD AUTO: 11.2 FL (ref 8.9–12.9)
RBC # BLD AUTO: 3.96 M/UL (ref 3.8–5.2)
WBC # BLD AUTO: 10.6 K/UL (ref 3.6–11)

## 2021-05-06 PROCEDURE — 85025 COMPLETE CBC W/AUTO DIFF WBC: CPT

## 2021-05-06 PROCEDURE — 65410000002 HC RM PRIVATE OB

## 2021-05-06 PROCEDURE — 36415 COLL VENOUS BLD VENIPUNCTURE: CPT

## 2021-05-06 PROCEDURE — 85461 HEMOGLOBIN FETAL: CPT

## 2021-05-06 PROCEDURE — 74011250637 HC RX REV CODE- 250/637: Performed by: OBSTETRICS & GYNECOLOGY

## 2021-05-06 PROCEDURE — 74011250636 HC RX REV CODE- 250/636: Performed by: ANESTHESIOLOGY

## 2021-05-06 PROCEDURE — 74011250636 HC RX REV CODE- 250/636: Performed by: OBSTETRICS & GYNECOLOGY

## 2021-05-06 PROCEDURE — 86901 BLOOD TYPING SEROLOGIC RH(D): CPT

## 2021-05-06 RX ADMIN — IBUPROFEN 800 MG: 400 TABLET, FILM COATED ORAL at 10:10

## 2021-05-06 RX ADMIN — KETOROLAC TROMETHAMINE 30 MG: 30 INJECTION, SOLUTION INTRAMUSCULAR; INTRAVENOUS at 04:13

## 2021-05-06 RX ADMIN — DOCUSATE SODIUM 100 MG: 100 CAPSULE, LIQUID FILLED ORAL at 10:10

## 2021-05-06 RX ADMIN — IBUPROFEN 800 MG: 400 TABLET, FILM COATED ORAL at 17:50

## 2021-05-06 RX ADMIN — OXYCODONE HYDROCHLORIDE AND ACETAMINOPHEN 1 TABLET: 5; 325 TABLET ORAL at 21:47

## 2021-05-06 RX ADMIN — OXYCODONE HYDROCHLORIDE AND ACETAMINOPHEN 1 TABLET: 5; 325 TABLET ORAL at 17:40

## 2021-05-06 RX ADMIN — OXYCODONE HYDROCHLORIDE AND ACETAMINOPHEN 1 TABLET: 5; 325 TABLET ORAL at 13:45

## 2021-05-06 RX ADMIN — HUMAN RHO(D) IMMUNE GLOBULIN 0.3 MG: 300 INJECTION, SOLUTION INTRAMUSCULAR at 17:40

## 2021-05-06 NOTE — ROUTINE PROCESS
0810:Bedside and Verbal shift change report given to SARAH Bean (oncoming nurse) by Shira Ambriz (offgoing nurse). Report included the following information SBAR.

## 2021-05-06 NOTE — PROGRESS NOTES
Post-Operative  Day 1    Venia Case     Assessment: Post-Op day 1, stable s/p RLTCS at 34w for vasa previa. Plan:   1. Routine post-operative care   2. QBL - 1280cc, Hgb 13.0-->11.2, VSS      Information for the patient's :  Steve Day [[id number]]   , Low Transverse    Patient doing well without significant complaint. Nausea and vomiting resolved, tolerating PO, no flatus. Vitals:  Visit Vitals  /74 (BP 1 Location: Right arm, BP Patient Position: At rest)   Pulse 76   Temp 97.7 °F (36.5 °C)   Resp 16   Ht 5' 5\" (1.651 m)   Wt 70.8 kg (156 lb)   SpO2 100%   Breastfeeding Unknown   BMI 25.96 kg/m²     Temp (24hrs), Av.7 °F (36.5 °C), Min:97.5 °F (36.4 °C), Max:97.9 °F (36.6 °C)      Last 24hr Input/Output:    Intake/Output Summary (Last 24 hours) at 2021 0822  Last data filed at 2021 1735  Gross per 24 hour   Intake 200 ml   Output 1980 ml   Net -1780 ml          Exam:        Patient without distress. Abdomen, soft, expected tenderness, fundus firm   - Wound dressing intact, will remove later today               Lower extremities are symmetric without tenderness, cords or erythema.     Labs:   Lab Results   Component Value Date/Time    WBC 10.6 2021 04:20 AM    WBC 9.1 2021 06:13 AM    WBC 10.0 2021 07:08 PM    HGB 11.2 (L) 2021 04:20 AM    HGB 13.0 2021 06:13 AM    HGB 13.2 2021 07:08 PM    HCT 34.7 (L) 2021 04:20 AM    HCT 40.3 2021 06:13 AM    HCT 39.7 2021 07:08 PM    PLATELET 376  04:20 AM    PLATELET 942 (L)  06:13 AM    PLATELET 438  07:08 PM       Recent Results (from the past 24 hour(s))   CBC WITH AUTOMATED DIFF    Collection Time: 21  4:20 AM   Result Value Ref Range    WBC 10.6 3.6 - 11.0 K/uL    RBC 3.96 3.80 - 5.20 M/uL    HGB 11.2 (L) 11.5 - 16.0 g/dL    HCT 34.7 (L) 35.0 - 47.0 %    MCV 87.6 80.0 - 99.0 FL    MCH 28.3 26.0 - 34.0 PG    MCHC 32.3 30.0 - 36.5 g/dL    RDW 14.0 11.5 - 14.5 %    PLATELET 637 102 - 134 K/uL    MPV 11.2 8.9 - 12.9 FL    NRBC 0.0 0  WBC    ABSOLUTE NRBC 0.00 0.00 - 0.01 K/uL    NEUTROPHILS 78 (H) 32 - 75 %    LYMPHOCYTES 14 12 - 49 %    MONOCYTES 6 5 - 13 %    EOSINOPHILS 1 0 - 7 %    BASOPHILS 0 0 - 1 %    IMMATURE GRANULOCYTES 1 (H) 0.0 - 0.5 %    ABS. NEUTROPHILS 8.3 (H) 1.8 - 8.0 K/UL    ABS. LYMPHOCYTES 1.5 0.8 - 3.5 K/UL    ABS. MONOCYTES 0.6 0.0 - 1.0 K/UL    ABS. EOSINOPHILS 0.1 0.0 - 0.4 K/UL    ABS. BASOPHILS 0.0 0.0 - 0.1 K/UL    ABS. IMM.  GRANS. 0.1 (H) 0.00 - 0.04 K/UL    DF AUTOMATED

## 2021-05-06 NOTE — LACTATION NOTE
Patient states she has not begun to pump yet. She said she is not sure she wants to pump. She tried pumping with her other children and she was not able produce more than 1\2 ounce at a time. It was very stressful for her. She has a history of breast augmentation. Before her breast surgery she said a 32A bra was too big for her. She may not have much mammary glands to make milk. I showed mom hand expression. We tried both breasts but we were not able to express anything from either breast. Mom may continue to try hand expression but she does not want to pump.

## 2021-05-07 VITALS
BODY MASS INDEX: 25.99 KG/M2 | HEIGHT: 65 IN | DIASTOLIC BLOOD PRESSURE: 72 MMHG | OXYGEN SATURATION: 100 % | HEART RATE: 65 BPM | TEMPERATURE: 97.7 F | WEIGHT: 156 LBS | SYSTOLIC BLOOD PRESSURE: 111 MMHG | RESPIRATION RATE: 16 BRPM

## 2021-05-07 LAB
ABO + RH BLD: NORMAL
BLD PROD TYP BPU: NORMAL
BPU ID: NORMAL
FETAL SCREEN,FMHS: NORMAL
STATUS OF UNIT,%ST: NORMAL
UNIT DIVISION, %UDIV: 0
WEAK D AG RBC QL: NORMAL

## 2021-05-07 PROCEDURE — 74011250637 HC RX REV CODE- 250/637: Performed by: OBSTETRICS & GYNECOLOGY

## 2021-05-07 RX ORDER — OXYCODONE AND ACETAMINOPHEN 5; 325 MG/1; MG/1
1 TABLET ORAL
Qty: 16 TAB | Refills: 0 | Status: SHIPPED | OUTPATIENT
Start: 2021-05-07 | End: 2021-05-10

## 2021-05-07 RX ORDER — IBUPROFEN 600 MG/1
600 TABLET ORAL
Qty: 30 TAB | Refills: 1 | Status: SHIPPED | OUTPATIENT
Start: 2021-05-07 | End: 2021-05-07

## 2021-05-07 RX ORDER — OXYCODONE AND ACETAMINOPHEN 5; 325 MG/1; MG/1
1 TABLET ORAL
Qty: 16 TAB | Refills: 0 | Status: SHIPPED | OUTPATIENT
Start: 2021-05-07 | End: 2021-05-07

## 2021-05-07 RX ORDER — IBUPROFEN 600 MG/1
600 TABLET ORAL
Qty: 30 TAB | Refills: 1 | Status: SHIPPED | OUTPATIENT
Start: 2021-05-07

## 2021-05-07 RX ADMIN — OXYCODONE HYDROCHLORIDE AND ACETAMINOPHEN 1 TABLET: 5; 325 TABLET ORAL at 10:23

## 2021-05-07 RX ADMIN — OXYCODONE HYDROCHLORIDE AND ACETAMINOPHEN 1 TABLET: 5; 325 TABLET ORAL at 05:52

## 2021-05-07 RX ADMIN — IBUPROFEN 800 MG: 400 TABLET, FILM COATED ORAL at 05:52

## 2021-05-07 RX ADMIN — DOCUSATE SODIUM 100 MG: 100 CAPSULE, LIQUID FILLED ORAL at 08:54

## 2021-05-07 NOTE — PROGRESS NOTES
Post-Operative  Day 2    Harpreet Solano     Assessment: Post-Op day 2, doing well    Plan:   1. Routine post-operative care  2. Likely planning discharge home. Information for the patient's :  Jax Anne [[id number]]   , Low Transverse    Patient doing well without significant complaint. Nausea and vomiting resolved, tolerating liquids, passing flatus, voiding and ambulating without difficulty. Vitals:  Visit Vitals  /72 (BP 1 Location: Right upper arm)   Pulse 65   Temp 97.7 °F (36.5 °C)   Resp 16   Ht 5' 5\" (1.651 m)   Wt 70.8 kg (156 lb)   SpO2 100%   Breastfeeding Unknown   BMI 25.96 kg/m²     Temp (24hrs), Av °F (36.7 °C), Min:97.7 °F (36.5 °C), Max:98.3 °F (36.8 °C)        Exam:        Patient without distress. Abdomen, bowel sounds present, soft, expected tenderness, fundus firm                Wound incision clean, dry and intact, bandage in situ. Lower extremities are negative for swelling, cords or tenderness. Labs:   Lab Results   Component Value Date/Time    WBC 10.6 2021 04:20 AM    WBC 9.1 2021 06:13 AM    WBC 10.0 2021 07:08 PM    HGB 11.2 (L) 2021 04:20 AM    HGB 13.0 2021 06:13 AM    HGB 13.2 2021 07:08 PM    HCT 34.7 (L) 2021 04:20 AM    HCT 40.3 2021 06:13 AM    HCT 39.7 2021 07:08 PM    PLATELET 444  04:20 AM    PLATELET 361 (L)  06:13 AM    PLATELET 488  07:08 PM       No results found for this or any previous visit (from the past 24 hour(s)).

## 2021-05-07 NOTE — DISCHARGE INSTRUCTIONS
We know that all of us are dealing with a tremendous amount of uncertainty, confusion and disruption to our daily lives, which may result in increased anxiety, depression and fear. If you are feeling unsettled or worse, please know that we are here to help. During this time of increased caution and care for one another, [facility] [place] ([address] [place] [address]) is offering virtual support groups to ALL MOTHERS in [place] regardless of the age of your child/children as a way to help weather this emotional storm together. Social support is an important part of self-care during this time of physical distancing. Virtual postpartum support group meetings available at www. postpartumva.org  Warm Line: 901.528.2365    Breastfeeding Support Groups (virtual)   and  of each month   and  of each month    [place] at [email or web address] under the \"About Us\" and \"Classes and Events tabs\"    POSTPARTUM DISCHARGE INSTRUCTIONS       Name:  Suzan Ordonez  YOB: 1990  Admission Diagnosis:  Placental abnormality [O43.109]     Discharge Diagnosis:    Problem List as of 2021 Date Reviewed: 2021          Codes Class Noted - Resolved    Placental abnormality ICD-10-CM: O43.109  ICD-9-CM: 656.70  2021 - Present        Vasa previa ICD-10-CM: O69. 5JK6  ICD-9-CM: 663.50  2021 - Present            Attending Physician:  Nico Gayle,*    Delivery Type:   Section: What to Expect at Home    Your Recovery:  A  section, or , is surgery to deliver your baby through a cut, called an incision that the doctor makes in your lower belly and uterus. You may have some pain in your lower belly and need pain medicine for 1 to 2 weeks. You can expect some vaginal bleeding for several weeks. You will probably need about 6 weeks to fully recover. It is important to take it easy while the incision is healing.  Avoid heavy lifting, strenuous activities, or exercises that strain the belly muscles while you are recovering. Ask a family member or friend for help with housework, cooking, and shopping. This care sheet gives you a general idea about how long it will take for you to recover. But each person recovers at a different pace. Follow the steps below to get better as quickly as possible. How can you care for yourself at home? Activity  · Rest when you feel tired. Getting enough sleep will help you recover. · Try to walk each day. Start by walking a little more than you did the day before. Bit by bit, increase the amount you walk. Walking boosts blood flow and helps prevent pneumonia, constipation, and blood clots. · Avoid strenuous activities, such as bicycle riding, jogging, weightlifting, and aerobic exercise, for 6 weeks or until your doctor says it is okay. · Until your doctor says it is okay, do not lift anything heavier than your baby. · Do not do sit-ups or other exercise that strain the belly muscles for 6 weeks or until your doctor says it is okay. · Hold a pillow over your incision when you cough or take deep breaths. This will support your belly and decrease your pain. · You may shower as usual. Pat the incision dry when you are done. · You will have some vaginal bleeding. Wear sanitary pads. Do not douche or use tampons until your doctor says it is okay. · Ask your doctor when you can drive again. · You will probably need to take at least 6 weeks off work. It depends on the type of work you do and how you feel. · Wait until you are healed (about 4 to 6 weeks) before you have sexual intercourse. Your doctor will tell you when it is okay to have sex. · Talk to your doctor about birth control. You can get pregnant even before your period returns. Also, you can get pregnant while you are breast-feeding.     Incision care  Your skin is your body's first line of defense against germs, but an incision site leaves an easy way for germs to enter your body. To prevent infection:  · Clean your hands frequently and before and after changing any touching any dressings. · If you have strips of tape on the incision, leave the tape on for a week or until it falls off. · Look at your incision closely every day for any changes. Contact your doctor if you experience any signs of infection, such as fever or increased redness at the surgical site. · Wash the area daily with warm, soapy water, and pat it dry. Don't use hydrogen peroxide or alcohol, which can slow healing. You may cover the area with a gauze bandage if it weeps or rubs against clothing. Change the bandage every day. · Keep the area clean and dry. Diet  · You can eat your normal diet. If your stomach is upset, try bland, low-fat foods like plain rice, broiled chicken, toast, and yogurt. · Drink plenty of fluids (unless your doctor tells you not to). · You may notice that your bowel movements are not regular right after your surgery. This is common. Try to avoid constipation and straining with bowel movements. You may want to take a fiber supplement every day. If you have not had a bowel movement after a couple of days, ask your doctor about taking a mild laxative. · If you are breast-feeding, do not drink any alcohol. Medicines  · Take pain medicines exactly as directed. · If the doctor gave you a prescription medicine for pain, take it as prescribed. · If you are not taking a prescription pain medicine, ask your doctor if you can take an over-the-counter medicine such as acetaminophen (Tylenol), ibuprofen (Advil, Motrin), or naproxen (Aleve), for cramps. Read and follow all instructions on the label. Do not take aspirin, because it can cause more bleeding. Do not take acetaminophen (Tylenol) and other acetaminophen containing medications (i.e. Percocet) at the same time.   · If you think your pain medicine is making you sick to your stomach:  · Take your medicine after meals (unless your doctor has told you not to). · Ask your doctor for a different pain medicine. · If your doctor prescribed antibiotics, take them as directed. Do not stop taking them just because you feel better. You need to take the full course of antibiotics. Mental Health  · Many women get the \"baby blues\" during the first few days after childbirth. You may lose sleep, feel irritable, and cry easily. You may feel happy one minute and sad the next. Hormone changes are one cause of these emotional changes. Also, the demands of a new baby, along with visits from relatives or other family needs, add to a mother's stress. The \"baby blues\" often peak around the fourth day. Then they ease up in less than 2 weeks. · If your moodiness or anxiety lasts for more than 2 weeks, or if you feel like life is not worth living, you may have postpartum depression. This is different for each mother. Some mothers with serious depression may worry intensely about their infant's well-being. Others may feel distant from their child. Some mothers might even feel that they might harm their baby. A mother may have signs of paranoia, wondering if someone is watching her. · With all the changes in your life, you may not know if you are depressed. Pregnancy sometimes causes changes in how you feel that are similar to the symptoms of depression. · Symptoms of depression include:  · Feeling sad or hopeless and losing interest in daily activities. These are the most common symptoms of depression. · Sleeping too much or not enough. · Feeling tired. You may feel as if you have no energy. · Eating too much or too little. · [employer] ([employer]I) offers a Warm line; Chat with the Expert phone sessions; Information and Articles about Pregnancy and Postpartum Mood Disorders;  Comprehensive List of Free Support Groups; Knowledgeable local coordinators who will offer support, information, and resources; Guide to Resources on [employer]; Calendar of events in the  mood disorders community; Latest News and Research; and [facility] [address] for [employer]. Remember - You are not alone; You are not to blame; With help, you will be well. 7-540-688-PPD(9060). WWW. POSTPARTUM. NET   · Writing or talking about death, such as writing suicide notes or talking about guns, knives, or pills. Keep the numbers for these national suicide hotlines: 0-831-773-TALK (6-114.612.6741) and 3-176-HKMBSHL (7-347.364.6538). If you or someone you know talks about suicide or feeling hopeless, get help right away. Other instructions  · If you breast-feed your baby, you may be more comfortable while you are healing if you place the baby so that he or she is not resting on your belly. Try tucking your baby under your arm, with his or her body along the side you will be feeding on. Support your baby's upper body with your arm. With that hand you can control your baby's head to bring his or her mouth to your breast. This is sometimes called the football hold. Follow-up care is a key part of your treatment and safety. Be sure to make and go to all appointments, and call your doctor if you are having problems. It's also a good idea to know your test results and keep a list of the medicines you take. When should you call for help? Call 911 anytime you think you may need emergency care. For example, call if:  · You are thinking of hurting yourself, your baby, or anyone else. · You passed out (lost consciousness). · You have symptoms of a blood clot in your lung (called a pulmonary embolism). These may include:  · Sudden chest pain. · Trouble breathing. · Coughing up blood. Call your doctor now or seek immediate medical care if:    · You have severe vaginal bleeding. · You are soaking through a pad each hour for 2 or more hours. · Your vaginal bleeding seems to be getting heavier or is still bright red 4 days after delivery.   · You are dizzy or lightheaded, or you feel like you may faint. · You are vomiting or cannot keep fluids down. · You have a fever. · You have new or more belly pain. · You have loose stitches, or your incision comes open. · You have symptoms of infection, such as:  · Increased pain, swelling, warmth, or redness. · Red streaks leading from the incision. · Pus draining from the incision. · A fever  · You pass tissue (not just blood). · Your vaginal discharge smells bad. · Your belly feels tender or full and hard. · Your breasts are continuously painful or red. · You feel sad, anxious, or hopeless for more than a few days. · You have sudden, severe pain in your belly. · You have symptoms of a blood clot in your leg (called a deep vein thrombosis), such as:  · Pain in your calf, back of the knee, thigh, or groin. · Redness and swelling in your leg or groin. · You have symptoms of preeclampsia, such as:  · Sudden swelling of your face, hands, or feet. · New vision problems (such as dimness or blurring). · A severe headache. · Your blood pressure is higher than it should be or rises suddenly. · You have new nausea or vomiting. Watch closely for changes in your health, and be sure to contact your doctor if you have any problems. Additional Information:  Postpartum Support    PARENTS:  Are you feeling sad or depressed? Is it difficult for you to enjoy yourself? Do you feel more irritable or tense? Do you feel anxious or panicky? Are you having difficulty bonding with your baby? Do you feel as if you are \"out of control\" or \"going crazy\"? Are you worried that you might hurt your baby or yourself? FAMILIES: Do you worry that something is wrong but don't know how to help? Do you think that your partner or spouse is having problems coping? Are you worried that it may never get better?      While many women experience some mild mood change or \"the blues\" during or after the birth of a child, 1 in 9 women experience more significant symptoms of depression or anxiety. 1 in 10 Dads become depressed during the first year. Things you can do  Being a good parent includes taking care of yourself. If you take care of yourself, you will be able to take better care of your baby and your family. · Talk to a counselor or healthcare provider who has training in  mood and anxiety problems. · Learn as much as you can about pregnancy and postpartum depression and anxiety. · Get support from family and friends. Ask for help when you need it. · Join a support group in your area or online. · Keep active by walking, stretching or whatever form of exercise helps you to feel better. · Get enough rest and time for yourself. · Eat a healthy diet. · Don't give up! It may take more than one try to get the right help you need. These are general instructions for a healthy lifestyle:    No smoking/ No tobacco products/ Avoid exposure to second hand smoke    Surgeon General's Warning:  Quitting smoking now greatly reduces serious risk to your health. Obesity, smoking, and sedentary lifestyle greatly increases your risk for illness    A healthy diet, regular physical exercise & weight monitoring are important for maintaining a healthy lifestyle    Recognize signs and symptoms of STROKE:    F-face looks uneven    A-arms unable to move or move unevenly    S-speech slurred or non-existent    T-time-call 911 as soon as signs and symptoms begin - DO NOT go       back to bed or wait to see if you get better - TIME IS BRAIN. I have had the opportunity to make my options or choices for discharge. I have received and understand these instructions.

## 2021-05-07 NOTE — PROGRESS NOTES
Bedside and Verbal shift change report given to Essie Beltre RN (oncoming nurse) by Shira Morales RN (offgoing nurse). Report included the following information SBAR.

## 2021-05-07 NOTE — LACTATION NOTE
Patient has decided that she will not pump or hand express. It was stressful for her with the last baby and she was only pumping a very small amount. She has no questions for lactation.

## 2021-05-07 NOTE — PROGRESS NOTES
1:00 PM  I have reviewed discharge instructions with the patient. The patient verbalized understanding. Called MD Sanaz Evans because patient uses a different pharmacy than is listed on d/c paperwork.   RN changed to correct pharmacy and MD Sanaz Evans states he will send prescriptions to correct pharmacy ([facility].)

## 2021-05-07 NOTE — ROUTINE PROCESS
Bedside shift change report given to Edgar Damon RN (oncoming nurse) by Yonas Cao RN (offgoing nurse). Report included the following information SBAR, Procedure Summary, Intake/Output and Recent Results.

## 2021-05-07 NOTE — DISCHARGE SUMMARY
Obstetrical Discharge Summary     Name: Cristina Shah MRN: [id number]  SSN: [id number]    YOB: 1990  Age: 27 y.o. Sex: female      Admit Date: 2021    Discharge Date: 2021     Admitting Physician: Jolene Foley MD     Attending Physician:  Dwaine Hernandez, Bennie Cuadra,*     Admission Diagnoses: Placental abnormality [O43.109]    Discharge Diagnoses:   Information for the patient's :  Kaylin Lauren [[id number]]   Delivery of a   female infant via , Low Transverse on 2021 at 8:27 AM  by Jolene Foley. Apgars were 3  and 6 . Additional Diagnoses:   Hospital Problems  Date Reviewed: 2021          Codes Class Noted POA    Placental abnormality ICD-10-CM: O43.109  ICD-9-CM: 656.70  2021 Unknown             Lab Results   Component Value Date/Time    Rubella, External IMMUNE 2020    GrBStrep, External Negative 2021       Hospital Course: Normal hospital course following the delivery. Patient Instructions:   Current Discharge Medication List      START taking these medications    Details   ibuprofen (MOTRIN) 600 mg tablet Take 1 Tab by mouth every six (6) hours as needed for Pain. Qty: 30 Tab, Refills: 1      oxyCODONE-acetaminophen (PERCOCET) 5-325 mg per tablet Take 1 Tab by mouth every six (6) hours as needed for Pain for up to 3 days. Max Daily Amount: 4 Tabs. Qty: 16 Tab, Refills: 0    Associated Diagnoses: Placental abnormality in third trimester         CONTINUE these medications which have NOT CHANGED    Details   prenatal vit 40/iron/folic/dha (PRENATAL MULTI-DHA PO) Take  by mouth. STOP taking these medications       progesterone (PROMETRIUM) 200 mg capsule Comments:   Reason for Stopping:               Disposition at Discharge: Home or self care    Condition at Discharge: Stable    Reference my discharge instructions.     Follow-up Appointments   Procedures    FOLLOW UP VISIT Appointment in: 6 Weeks Standing Status:   Standing     Number of Occurrences:   1     Order Specific Question:   Appointment in     Answer:   6 Weeks        Signed By:  Jose Devi MD     May 7, 2021

## 2021-05-07 NOTE — PROGRESS NOTES
Bedside shift change report given to Iesha DewittRN (oncoming nurse) by Jovany Loyd RN (offgoing nurse). Report included the following information SBAR.

## 2022-03-18 PROBLEM — O43.109 PLACENTAL ABNORMALITY: Status: ACTIVE | Noted: 2021-04-21

## 2023-05-11 RX ORDER — IBUPROFEN 600 MG/1
TABLET ORAL EVERY 6 HOURS PRN
COMMUNITY
Start: 2021-05-07

## 2024-01-10 ENCOUNTER — OFFICE VISIT (OUTPATIENT)
Age: 34
End: 2024-01-10

## 2024-01-10 VITALS
WEIGHT: 147.6 LBS | OXYGEN SATURATION: 98 % | RESPIRATION RATE: 20 BRPM | DIASTOLIC BLOOD PRESSURE: 78 MMHG | HEIGHT: 65 IN | BODY MASS INDEX: 24.59 KG/M2 | SYSTOLIC BLOOD PRESSURE: 118 MMHG | TEMPERATURE: 98.2 F | HEART RATE: 66 BPM

## 2024-01-10 DIAGNOSIS — R07.0 THROAT PAIN IN ADULT: Primary | ICD-10-CM

## 2024-01-10 LAB
STREP PYOGENES DNA, POC: NEGATIVE
VALID INTERNAL CONTROL, POC: YES

## 2024-01-10 RX ORDER — LIDOCAINE HYDROCHLORIDE 20 MG/ML
10 SOLUTION OROPHARYNGEAL
Qty: 100 ML | Refills: 0 | Status: SHIPPED | OUTPATIENT
Start: 2024-01-10 | End: 2024-01-20

## 2024-01-10 NOTE — PATIENT INSTRUCTIONS
No identifiable cause of pain as visual exam not sufficient to see deeper into throat. No obvious oral lesions such as an aphthous ulcer are seen. No base of tongue mass palpated. Viscous lidocaine may help with pain if this is due to a resolving trauma. If not improving, need ENT evaluation.     I have discussed the results of my assessment, diagnosis and treatment plan with the patient. The patient also understands that early in the process of an illness, an Urgent Care work-up can be falsely reassuring. Therefore, if symptoms change, worsen or do not resolve and remain persistent, they should return to Urgent Care or seek further evaluation in the ED for immediate assessment. Additionally, they should continue care with their Primary provider. No further questions remained and patient was discharged to home.        Thank you for seeing us today at [facility]. I I hope you feel better soon.

## 2024-01-10 NOTE — PROGRESS NOTES
fever or bodyaches.  She has no known exposures to viral illnesses.  Patient states the pain is on her left throat.  She states it is very deep and at times she feels like there is a clump of mucus that just will not clear.  She has tried salt gargle rinses without symptom relief.  She has no history of autoimmune disease such as lupus or RA.  She states overall she is in good health, she has not recently seen a dentist.  Patient states she has been taking ibuprofen which gives her minimal relief.         Vitals:    01/10/24 1214   BP: 118/78   Site: Left Upper Arm   Position: Sitting   Cuff Size: Medium Adult   Pulse: 66   Resp: 20   Temp: 98.2 °F (36.8 °C)   TempSrc: Oral   SpO2: 98%   Weight: 67 kg (147 lb 9.6 oz)   Height: 1.651 m (5' 5\")       Results for POC orders placed in visit on 01/10/24   AMB POC STREP GO A DIRECT, DNA PROBE   Result Value Ref Range    Valid Internal Control, POC yes     Strep pyogenes DNA, POC Negative         Physical Exam  Constitutional:       General: She is not in acute distress.     Appearance: Normal appearance. She is not ill-appearing, toxic-appearing or diaphoretic.   HENT:      Head: Normocephalic.      Nose: Nose normal. No congestion.      Mouth/Throat:      Mouth: Mucous membranes are moist.      Pharynx: No oropharyngeal exudate or posterior oropharyngeal erythema.      Comments: Visual exams shows no mucosal abnormalities, except for a small area (pinpoint)  of white blanching on left tonsillar pillar. Finger exam shows a soft, mobile tongue. No palpable mass at tongue base. No dental pain with palpation. Sinuses non-tender.   Eyes:      Conjunctiva/sclera: Conjunctivae normal.      Pupils: Pupils are equal, round, and reactive to light.   Cardiovascular:      Rate and Rhythm: Normal rate and regular rhythm.      Heart sounds: No murmur heard.  Pulmonary:      Effort: Pulmonary effort is normal. No respiratory distress.      Breath sounds: Normal breath sounds.

## 2024-01-13 LAB
BACTERIA SPEC CULT: ABNORMAL
BACTERIA SPEC CULT: ABNORMAL
SERVICE CMNT-IMP: ABNORMAL

## 2024-03-26 ENCOUNTER — OFFICE VISIT (OUTPATIENT)
Age: 34
End: 2024-03-26

## 2024-03-26 VITALS
HEIGHT: 65 IN | WEIGHT: 149 LBS | TEMPERATURE: 98.4 F | OXYGEN SATURATION: 98 % | HEART RATE: 68 BPM | BODY MASS INDEX: 24.83 KG/M2 | RESPIRATION RATE: 18 BRPM | SYSTOLIC BLOOD PRESSURE: 120 MMHG | DIASTOLIC BLOOD PRESSURE: 82 MMHG

## 2024-03-26 DIAGNOSIS — J02.9 SORE THROAT: Primary | ICD-10-CM

## 2024-03-26 LAB
GROUP A STREP ANTIGEN, POC: NORMAL
VALID INTERNAL CONTROL, POC: YES

## 2024-03-26 RX ORDER — AMOXICILLIN 500 MG/1
500 CAPSULE ORAL 2 TIMES DAILY
Qty: 20 CAPSULE | Refills: 0 | Status: SHIPPED | OUTPATIENT
Start: 2024-03-26 | End: 2024-04-05

## 2024-03-26 RX ORDER — LIDOCAINE HYDROCHLORIDE 20 MG/ML
5 SOLUTION OROPHARYNGEAL PRN
Qty: 100 ML | Refills: 0 | Status: SHIPPED | OUTPATIENT
Start: 2024-03-26 | End: 2024-03-29

## 2024-03-26 ASSESSMENT — ENCOUNTER SYMPTOMS: SORE THROAT: 1

## 2024-03-26 NOTE — PROGRESS NOTES
Strep throat culture; Future  Other orders  -     lidocaine viscous hcl (XYLOCAINE) 2 % SOLN solution; Take 5 mLs by mouth as needed for Irritation or Pain      No orders to display   Vital signs stable  Alert and oriented x3    Rapid Strep Negative    The patient presents with symptoms suspicious for viral pharyngitis.  Differential includes URI, acute bronchitis, rhinosinusitis, allergic rhinitis or COVID.  Do not suspect underlying cardiopulmonary process.  I considered, but think unlikely, dangerous causes of this patient's symptoms to include acute epiglottitis, bacterial croup, infectious mononucleosis, or peritonsillar abscess.  Patient is nontoxic appearing and not in need of emergent medical intervention.    -Provided reassurance and reassessment  -Discussed over-the-counter medications for symptomatic relief  -Provided educational material and patient instructions  -Discharged patient with instructions to follow-up with pediatrician  -Advised to go immediately to the ED for worsening or persistent symptoms      I have discussed the results, diagnosis and treatment plan with the patient.  The patient also understands that early in the process of an illness, an urgent care workup can be falsely reassuring.  Routine discharge counseling and specific return precautions discussed with patient and the patient understands that worsening, changing or persistent symptoms should prompt an immediate return to the urgent care or emergency department.  Patient/Guardian expressed understanding and agrees with the discharge plan.  No further questions at time of discharge.    Susan James, CLARA - CNP

## 2024-03-26 NOTE — PATIENT INSTRUCTIONS
Thank you for visiting [facility].    Important:  **Change toothbrush after 24 hour of antibiotic use**  Avoid kissing or sharing beverages/utensils until all symptoms resolve    Please follow-up with primary care provider within 2-5 days if signs and symptoms have not resolved or worsened.    Please go immediately to the Emergency Department if you develop difficulty swallowing/breathing, respiratory distress, hoarse voice, drooling, difficulty opening jaw, stiff or swollen neck, shortness of breath, or uncontrollable fever/nausea/vomiting.    Strep Throat Symptomatic Relief:  Bacterial Infection:  Antibiotic:  Take as prescribed on full stomach until you complete entire course    Pain/fever/chills/body aches:  Tylenol every 4 hours OR Ibuprofen every 6 hours as needed    Sore Throat:  Lozenges, as needed.  Cepacol lozenges will help numb the throat  Chloraseptic spray also helps to numb throat pain  Salt water gargles to soothe throat pain with 1/2-1 tsp of Benadryl  Ice, popsicles, cold food to soothe throat

## 2024-03-30 LAB — S PYO THROAT QL CULT: NEGATIVE

## 2024-10-14 ENCOUNTER — OFFICE VISIT (OUTPATIENT)
Age: 34
End: 2024-10-14

## 2024-10-14 VITALS
BODY MASS INDEX: 19.87 KG/M2 | TEMPERATURE: 98.2 F | OXYGEN SATURATION: 99 % | WEIGHT: 119.4 LBS | HEART RATE: 67 BPM | RESPIRATION RATE: 18 BRPM | DIASTOLIC BLOOD PRESSURE: 79 MMHG | SYSTOLIC BLOOD PRESSURE: 111 MMHG

## 2024-10-14 DIAGNOSIS — N39.0 URINARY TRACT INFECTION WITHOUT HEMATURIA, SITE UNSPECIFIED: Primary | ICD-10-CM

## 2024-10-14 LAB
BILIRUBIN, URINE, POC: ABNORMAL
BLOOD URINE, POC: ABNORMAL
GLUCOSE URINE, POC: NEGATIVE
KETONES, URINE, POC: NEGATIVE
LEUKOCYTE ESTERASE, URINE, POC: ABNORMAL
NITRITE, URINE, POC: NEGATIVE
PH, URINE, POC: 5.5 (ref 4.6–8)
PROTEIN,URINE, POC: ABNORMAL
SPECIFIC GRAVITY, URINE, POC: 1.03 (ref 1–1.03)
URINALYSIS CLARITY, POC: ABNORMAL
URINALYSIS COLOR, POC: ABNORMAL
UROBILINOGEN, POC: ABNORMAL MG/DL

## 2024-10-14 RX ORDER — NITROFURANTOIN 25; 75 MG/1; MG/1
100 CAPSULE ORAL 2 TIMES DAILY
Qty: 14 CAPSULE | Refills: 0 | Status: SHIPPED | OUTPATIENT
Start: 2024-10-14 | End: 2024-10-21

## 2024-10-14 ASSESSMENT — ENCOUNTER SYMPTOMS
RESPIRATORY NEGATIVE: 1
ALLERGIC/IMMUNOLOGIC NEGATIVE: 1
EYES NEGATIVE: 1
GASTROINTESTINAL NEGATIVE: 1

## 2024-10-14 NOTE — PROGRESS NOTES
10/14/2024   Bev Mei (: 1990) is a 34 y.o. female, New patient, here for evaluation of the following chief complaint(s):  Urinary Tract Infection (Pt presents with burning and frequent urination since yesterday morning)     ASSESSMENT/PLAN:  Below is the assessment and plan developed based on review of pertinent history, physical exam, labs, studies, and medications.  1. Urinary tract infection without hematuria, site unspecified  -     AMB POC URINALYSIS DIP STICK MANUAL W/O MICRO  -     Culture, Urine; Future  -     nitrofurantoin, macrocrystal-monohydrate, (MACROBID) 100 MG capsule; Take 1 capsule by mouth 2 times daily for 7 days, Disp-14 capsule, R-0Normal         Handout given with care instructions  2. OTC for symptom management. Increase fluid intake, ensure adequate nutritional intake.  3. Follow up with PCP as needed.  4. Go to ED with development of any acute symptoms.     Follow up:  Return if symptoms worsen or fail to improve.  Follow up immediately for any new, worsening or changes or if symptoms are not improving over the next 5-7 days.     SUBJECTIVE/OBJECTIVE:    Urinary Tract Infection         Diagnoses and all orders for this visit:  Urinary tract infection without hematuria, site unspecified  Urinary Tract Infection (Pt presents with burning and frequent urination since yesterday morning)      Patient complains of dysuria.  Onset was 1 day ago, unchanged since that time. Patient denies back pain, fever, and stomach ache. Patient does have a history of recurrent UTI.  Patient does not have a history of pyelonephritis. Patient does not have a history of kidney stone.  I have advised patient to:  Drink plenty of fluids. If you develop fever, abdomenal pain, back pain, or nausea and vomiting then return to clinic or go to the emergency room.  This could indicate that you have a more serious infection or and infection in the kidneys.     Results for orders placed or performed in visit

## 2024-10-17 LAB — BACTERIA UR CULT: ABNORMAL

## (undated) DEVICE — COVERALL PREM SMS 2XL KNIT --

## (undated) DEVICE — DEVON™ KNEE AND BODY STRAP 60" X 3" (1.5 M X 7.6 CM): Brand: DEVON

## (undated) DEVICE — STERILE POLYISOPRENE POWDER-FREE SURGICAL GLOVES: Brand: PROTEXIS

## (undated) DEVICE — 3000CC GUARDIAN II: Brand: GUARDIAN

## (undated) DEVICE — DRAPE FLD WRM W44XL66IN C6L FOR INTRATEMP SYS THERMABASIN

## (undated) DEVICE — ANESTHESIA TRAY SPNL W/O NDL PENCAN

## (undated) DEVICE — SOLIDIFIER MEDC 1200ML -- CONVERT TO 356117

## (undated) DEVICE — SUTURE VCRL SZ 0 L36IN ABSRB VLT L40MM CT 1/2 CIR J358H

## (undated) DEVICE — Device: Brand: PORTEX

## (undated) DEVICE — DRESSING AQUACEL ADVANTAGE ALG W4XL5IN RECT GREATER ABSRB HYDRFBR TECHNOLOGY

## (undated) DEVICE — LIGHT HANDLE: Brand: DEVON

## (undated) DEVICE — DRAPE C SECT 254X285CM SM W/ VIEWING WIND INCIS ADH AREA AND

## (undated) DEVICE — SOLUTION IRRIG 1000ML H2O STRL BLT

## (undated) DEVICE — (D)PREP SKN CHLRAPRP APPL 26ML -- CONVERT TO ITEM 371833

## (undated) DEVICE — REM POLYHESIVE ADULT PATIENT RETURN ELECTRODE: Brand: VALLEYLAB

## (undated) DEVICE — SOLUTION IV 1000ML 0.9% SOD CHL

## (undated) DEVICE — ROYALSILK SURGICAL GOWN, L: Brand: CONVERTORS

## (undated) DEVICE — STAPLER SKIN SQ 30 ABSRB STPL DISP INSORB

## (undated) DEVICE — PACK PROCEDURE SURG C SECT KT SMH

## (undated) DEVICE — STERILE POLYISOPRENE POWDER-FREE SURGICAL GLOVES WITH EMOLLIENT COATING: Brand: PROTEXIS

## (undated) DEVICE — CATH FOLEY 16F LUBRI-SIL IC --

## (undated) DEVICE — KENDALL SCD EXPRESS SLEEVES, KNEE LENGTH, MEDIUM: Brand: KENDALL SCD